# Patient Record
Sex: MALE | NOT HISPANIC OR LATINO | Employment: FULL TIME | URBAN - NONMETROPOLITAN AREA
[De-identification: names, ages, dates, MRNs, and addresses within clinical notes are randomized per-mention and may not be internally consistent; named-entity substitution may affect disease eponyms.]

---

## 2017-02-24 ENCOUNTER — OCCUPATIONAL MEDICINE (OUTPATIENT)
Dept: URGENT CARE | Facility: PHYSICIAN GROUP | Age: 37
End: 2017-02-24

## 2017-02-24 VITALS
TEMPERATURE: 98.4 F | HEART RATE: 89 BPM | BODY MASS INDEX: 25.77 KG/M2 | SYSTOLIC BLOOD PRESSURE: 122 MMHG | HEIGHT: 70 IN | DIASTOLIC BLOOD PRESSURE: 72 MMHG | WEIGHT: 180 LBS | OXYGEN SATURATION: 96 %

## 2017-02-24 DIAGNOSIS — Z02.89 EXAMINATION, PHYSICAL, EMPLOYEE: ICD-10-CM

## 2017-02-24 PROCEDURE — 99204 OFFICE O/P NEW MOD 45 MIN: CPT | Performed by: PHYSICIAN ASSISTANT

## 2017-02-24 PROCEDURE — 8906 PR URINE 11 PANEL: Performed by: PHYSICIAN ASSISTANT

## 2017-02-24 NOTE — PROGRESS NOTES
36 y.o. male comes in for a preemployment physical. No major medical history, no chronic conditions, no chronic medications. No history of asthma, heart disease, murmurs, seizure disorder or syncopal episodes with activity. He completed his physical therapy evaluation, A attached indicating he has no limitations or restrictions.  Visual acuity is reduced in the right eye, 20/50 without correction. Recommended optometry evaluation  Please see the chart for further information, however normal physical exam, cleared for employment without restrictions.

## 2017-02-24 NOTE — MR AVS SNAPSHOT
"Jose Daniel Shipley   2017 9:20 AM   Occupational Medicine   MRN: [id number]    Department:  [facility]   Dept Phone:  [phone]    Description:  Male : 1980   Provider:  Omero Reyes PA-C           Reason for Visit     Employment Physical           Allergies as of 2017     No Known Allergies      You were diagnosed with     Examination, physical, employee   [[id number]]         Vital Signs     Blood Pressure Pulse Temperature Height Weight Body Mass Index    122/72 mmHg 89 36.9 °C (98.4 °F) 1.778 m (5' 10\") 81.647 kg (180 lb) 25.83 kg/m2    Oxygen Saturation Smoking Status                96% Never Smoker           Basic Information     Date Of Birth Sex Race Ethnicity Preferred Language    1980 Male Unable to Obtain Non- English      Health Maintenance     Patient has no pending health maintenance at this time      Current Immunizations     No immunizations on file.      Below and/or attached are the medications your provider expects you to take. Review all of your home medications and newly ordered medications with your provider and/or pharmacist. Follow medication instructions as directed by your provider and/or pharmacist. Please keep your medication list with you and share with your provider. Update the information when medications are discontinued, doses are changed, or new medications (including over-the-counter products) are added; and carry medication information at all times in the event of emergency situations     Allergies:  No Known Allergies          Medications  Valid as of: 2017 - 10:33 AM    Generic Name Brand Name Tablet Size Instructions for use    .                 Medicines prescribed today were sent to:     None      Medication refill instructions:       If your prescription bottle indicates you have medication refills left, it is not necessary to call your provider’s office. Please contact your pharmacy and they will refill your " medication.    If your prescription bottle indicates you do not have any refills left, you may request refills at any time through one of the following ways: The online [employer] system (except Urgent Care), by calling your provider’s office, or by asking your pharmacy to contact your provider’s office with a refill request. Medication refills are processed only during regular business hours and may not be available until the next business day. Your provider may request additional information or to have a follow-up visit with you prior to refilling your medication.   *Please Note: Medication refills are assigned a new Rx number when refilled electronically. Your pharmacy may indicate that no refills were authorized even though a new prescription for the same medication is available at the pharmacy. Please request the medicine by name with the pharmacy before contacting your provider for a refill.           Re[employer] Access Code: [email or web address]  Expires: 3/26/2017 10:33 AM    Your email address is not on file at [facility].  Email Addresses are required for you to sign up for [employer], please contact [phone] to verify your personal information and to provide your email address prior to attempting to register for [employer].    Jose Daniel Shipley  [address] [place] [address]    Re[employer]  A secure, online tool to manage your health information     [facility]’s Re[employer]® is a secure, online tool that connects you to your personalized health information from the privacy of your home -- day or night - making it very easy for you to manage your healthcare. Once the activation process is completed, you can even access your medical information using the Re[employer] nelia, which is available for free in the Apple Nelia store or Google Play store.     To learn more about Re[employer], visit [email or web address]    There are two levels of access available (as shown below):   My Chart Features  [facility] Primary Care Doctor  [facility]  Specialists [facility]  Urgent  Care [facility] Primary Care Doctor   Email your healthcare team securely and privately 24/7 X X X    Manage appointments: schedule your next appointment; view details of past/upcoming appointments X      Request prescription refills. X      View recent personal medical records, including lab and immunizations X X X X   View health record, including health history, allergies, medications X X X X   Read reports about your outpatient visits, procedures, consult and ER notes X X X X   See your discharge summary, which is a recap of your hospital and/or ER visit that includes your diagnosis, lab results, and care plan X X  X     How to register for [employer]:  Once your e-mail address has been verified, follow the following steps to sign up for [employer].     1. Go to  [email or web address]  2. Click on the Sign Up Now box, which takes you to the New Member Sign Up page. You will need to provide the following information:  a. Enter your [employer] Access Code exactly as it appears at the top of this page. (You will not need to use this code after you’ve completed the sign-up process. If you do not sign up before the expiration date, you must request a new code.)   b. Enter your date of birth.   c. Enter your home email address.   d. Click Submit, and follow the next screen’s instructions.  3. Create a EdeniQ ID. This will be your [employer] login ID and cannot be changed, so think of one that is secure and easy to remember.  4. Create a [employer] password. You can change your password at any time.  5. Enter your Password Reset Question and Answer. This can be used at a later time if you forget your password.   6. Enter your e-mail address. This allows you to receive e-mail notifications when new information is available in [employer].  7. Click Sign Up. You can now view your health information.    For assistance activating your [employer] account, call [phone]

## 2017-08-18 ENCOUNTER — NON-PROVIDER VISIT (OUTPATIENT)
Dept: URGENT CARE | Facility: PHYSICIAN GROUP | Age: 37
End: 2017-08-18

## 2017-08-18 DIAGNOSIS — Z02.1 PRE-EMPLOYMENT DRUG SCREENING: ICD-10-CM

## 2017-08-18 PROCEDURE — 8907 PR URINE COLLECT ONLY: Performed by: PHYSICIAN ASSISTANT

## 2017-11-09 ENCOUNTER — NON-PROVIDER VISIT (OUTPATIENT)
Dept: URGENT CARE | Facility: PHYSICIAN GROUP | Age: 37
End: 2017-11-09

## 2017-11-09 DIAGNOSIS — Z02.1 PRE-EMPLOYMENT DRUG SCREENING: ICD-10-CM

## 2017-11-09 LAB
AMP AMPHETAMINE: NORMAL
COC COCAINE: NORMAL
INT CON NEG: NORMAL
INT CON POS: NORMAL
MET METHAMPHETAMINES: NORMAL
OPI OPIATES: NORMAL
PCP PHENCYCLIDINE: NORMAL
POC DRUG COMMENT 753798-OCCUPATIONAL HEALTH: NORMAL
THC: NORMAL

## 2017-11-09 PROCEDURE — 80305 DRUG TEST PRSMV DIR OPT OBS: CPT | Performed by: PHYSICIAN ASSISTANT

## 2018-05-11 ENCOUNTER — OCCUPATIONAL MEDICINE (OUTPATIENT)
Dept: URGENT CARE | Facility: PHYSICIAN GROUP | Age: 38
End: 2018-05-11

## 2018-05-11 VITALS
WEIGHT: 182 LBS | DIASTOLIC BLOOD PRESSURE: 72 MMHG | HEIGHT: 69 IN | BODY MASS INDEX: 26.96 KG/M2 | TEMPERATURE: 96.9 F | RESPIRATION RATE: 14 BRPM | OXYGEN SATURATION: 100 % | HEART RATE: 60 BPM | SYSTOLIC BLOOD PRESSURE: 124 MMHG

## 2018-05-11 DIAGNOSIS — Z02.89 PHYSICAL EXAMINATION OF EMPLOYEE: ICD-10-CM

## 2018-05-11 DIAGNOSIS — Z02.1 PHYSICAL EXAM, PRE-EMPLOYMENT: ICD-10-CM

## 2018-05-11 DIAGNOSIS — Z02.1 PRE-EMPLOYMENT DRUG SCREENING: ICD-10-CM

## 2018-05-11 LAB
AMP AMPHETAMINE: NORMAL
COC COCAINE: NORMAL
INT CON NEG: NORMAL
INT CON POS: NORMAL
MET METHAMPHETAMINES: NORMAL
OPI OPIATES: NORMAL
PCP PHENCYCLIDINE: NORMAL
POC DRUG COMMENT 753798-OCCUPATIONAL HEALTH: NEGATIVE
THC: NORMAL

## 2018-05-11 PROCEDURE — 92553 AUDIOMETRY AIR & BONE: CPT | Performed by: EMERGENCY MEDICINE

## 2018-05-11 PROCEDURE — 8919 PR LIMITED CLEARANCE: Performed by: EMERGENCY MEDICINE

## 2018-05-11 PROCEDURE — 94010 BREATHING CAPACITY TEST: CPT | Performed by: EMERGENCY MEDICINE

## 2018-05-11 PROCEDURE — 80305 DRUG TEST PRSMV DIR OPT OBS: CPT | Performed by: EMERGENCY MEDICINE

## 2018-05-11 ASSESSMENT — ENCOUNTER SYMPTOMS
HEMOPTYSIS: 0
EYE DISCHARGE: 0
SPEECH CHANGE: 0
FEVER: 0
NECK PAIN: 0
EYE REDNESS: 0
PALPITATIONS: 0
MYALGIAS: 0
NAUSEA: 0
COUGH: 0
SPUTUM PRODUCTION: 0
SENSORY CHANGE: 0
BACK PAIN: 0
ABDOMINAL PAIN: 0
CHILLS: 0
INSOMNIA: 0
VOMITING: 0
SINUS PAIN: 0

## 2018-05-12 NOTE — PROGRESS NOTES
"Subjective:      Jose Daniel Shipley is a 37 y.o. male who presents with Employment Physical            HPI  Patient is here for a physical examination as a . Patient denies any significant medical issues. No history of seizures, concussions cardiac dysrhythmias or major surgeries.    No Known Allergies   Social History     Social History   • Marital status: Unknown     Spouse name: N/A   • Number of children: N/A   • Years of education: N/A     Occupational History   • Not on file.     Social History Main Topics   • Smoking status: Never Smoker   • Smokeless tobacco: Not on file   • Alcohol use Not on file   • Drug use: Unknown   • Sexual activity: Not on file     Other Topics Concern   • Not on file     Social History Narrative   • No narrative on file   No past medical history on file.   No current outpatient prescriptions on file prior to visit.     No current facility-administered medications on file prior to visit.    No family history on file.  Review of Systems   Constitutional: Negative for chills and fever.   HENT: Negative for congestion and sinus pain.    Eyes: Negative for discharge and redness.   Respiratory: Negative for cough, hemoptysis and sputum production.    Cardiovascular: Negative for chest pain and palpitations.   Gastrointestinal: Negative for abdominal pain, nausea and vomiting.   Genitourinary: Negative.    Musculoskeletal: Negative for back pain, joint pain, myalgias and neck pain.   Skin: Negative for rash.   Neurological: Negative for sensory change and speech change.   Psychiatric/Behavioral: The patient does not have insomnia.           Objective:     /72   Pulse 60   Temp 36.1 °C (96.9 °F)   Resp 14   Ht 1.753 m (5' 9\")   Wt 82.6 kg (182 lb)   SpO2 100%   BMI 26.88 kg/m²      Physical Exam   Constitutional: He appears well-developed and well-nourished. No distress.   HENT:   Head: Normocephalic and atraumatic.   Right Ear: External ear normal.   Left Ear: " External ear normal.   Eyes: Conjunctivae and EOM are normal. Pupils are equal, round, and reactive to light. Right eye exhibits no discharge. Left eye exhibits no discharge.   Neck: Normal range of motion.   Cardiovascular: Normal rate.    No murmur heard.  Pulmonary/Chest: Effort normal. Stridor present. No respiratory distress. He has no wheezes.   Abdominal: He exhibits no distension. There is tenderness.   Musculoskeletal: Normal range of motion. He exhibits no edema or tenderness.   Neurological: He is alert. No cranial nerve deficit.   Skin: He is not diaphoretic. No erythema.   Psychiatric: He has a normal mood and affect. His behavior is normal.   Vitals reviewed.              Assessment/Plan:     1. Physical examination of employee / patient passed physical with no restrictions.      2. Pre-employment drug screening      Please refer to the history and physical scanned into the chart  - POCT 6 Panel Urine Drug Screen

## 2025-07-08 ENCOUNTER — HOSPITAL ENCOUNTER (INPATIENT)
Facility: MEDICAL CENTER | Age: 45
LOS: 2 days | DRG: 918 | End: 2025-07-10
Attending: EMERGENCY MEDICINE | Admitting: STUDENT IN AN ORGANIZED HEALTH CARE EDUCATION/TRAINING PROGRAM
Payer: MEDICAID

## 2025-07-08 DIAGNOSIS — T14.90XA TRAUMA: ICD-10-CM

## 2025-07-08 DIAGNOSIS — T63.011A RATTLESNAKE BITE, ACCIDENTAL OR UNINTENTIONAL, INITIAL ENCOUNTER: Primary | ICD-10-CM
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
## 2025-07-08 PROBLEM — F10.929 ACUTE ALCOHOL INTOXICATION (HCC): Status: ACTIVE | Noted: 2025-07-08

## 2025-07-08 PROBLEM — Z53.09 CONTRAINDICATION TO DEEP VEIN THROMBOSIS (DVT) PROPHYLAXIS: Status: ACTIVE | Noted: 2025-07-08

## 2025-07-08 PROBLEM — Z78.9 NO CONTRAINDICATION TO DEEP VEIN THROMBOSIS (DVT) PROPHYLAXIS: Status: ACTIVE | Noted: 2025-07-08

## 2025-07-08 LAB
ABO + RH BLD: NORMAL
ABO GROUP BLD: NORMAL
ALBUMIN SERPL BCP-MCNC: 4.4 G/DL (ref 3.2–4.9)
ALBUMIN/GLOB SERPL: 1.3 G/DL
ALP SERPL-CCNC: 88 U/L (ref 30–99)
ALT SERPL-CCNC: 14 U/L (ref 2–50)
ANION GAP SERPL CALC-SCNC: 16 MMOL/L (ref 7–16)
APTT PPP: 29.6 SEC (ref 24.7–36)
AST SERPL-CCNC: 33 U/L (ref 12–45)
BILIRUB SERPL-MCNC: 0.9 MG/DL (ref 0.1–1.5)
BLD GP AB SCN SERPL QL: NORMAL
BUN SERPL-MCNC: 12 MG/DL (ref 8–22)
CALCIUM ALBUM COR SERPL-MCNC: 8.7 MG/DL (ref 8.5–10.5)
CALCIUM SERPL-MCNC: 9 MG/DL (ref 8.5–10.5)
CHLORIDE SERPL-SCNC: 99 MMOL/L (ref 96–112)
CK SERPL-CCNC: 196 U/L (ref 0–154)
CO2 SERPL-SCNC: 21 MMOL/L (ref 20–33)
CREAT SERPL-MCNC: 0.96 MG/DL (ref 0.5–1.4)
ERYTHROCYTE [DISTWIDTH] IN BLOOD BY AUTOMATED COUNT: 42.4 FL (ref 35.9–50)
ERYTHROCYTE [DISTWIDTH] IN BLOOD BY AUTOMATED COUNT: 42.5 FL (ref 35.9–50)
ERYTHROCYTE [DISTWIDTH] IN BLOOD BY AUTOMATED COUNT: 43.6 FL (ref 35.9–50)
ERYTHROCYTE [DISTWIDTH] IN BLOOD BY AUTOMATED COUNT: 43.8 FL (ref 35.9–50)
ERYTHROCYTE [DISTWIDTH] IN BLOOD BY AUTOMATED COUNT: 43.8 FL (ref 35.9–50)
ERYTHROCYTE [DISTWIDTH] IN BLOOD BY AUTOMATED COUNT: 43.9 FL (ref 35.9–50)
ETHANOL BLD-MCNC: 205 MG/DL
FIBRINOGEN PPP-MCNC: 234 MG/DL (ref 215–460)
FIBRINOGEN PPP-MCNC: 267 MG/DL (ref 215–460)
FIBRINOGEN PPP-MCNC: 269 MG/DL (ref 215–460)
FIBRINOGEN PPP-MCNC: 275 MG/DL (ref 215–460)
FIBRINOGEN PPP-MCNC: 286 MG/DL (ref 215–460)
FIBRINOGEN PPP-MCNC: 288 MG/DL (ref 215–460)
GFR SERPLBLD CREATININE-BSD FMLA CKD-EPI: 99 ML/MIN/1.73 M 2
GLOBULIN SER CALC-MCNC: 3.4 G/DL (ref 1.9–3.5)
GLUCOSE SERPL-MCNC: 108 MG/DL (ref 65–99)
HCT VFR BLD AUTO: 38.2 % (ref 42–52)
HCT VFR BLD AUTO: 38.7 % (ref 42–52)
HCT VFR BLD AUTO: 39 % (ref 42–52)
HCT VFR BLD AUTO: 39.2 % (ref 42–52)
HCT VFR BLD AUTO: 41.7 % (ref 42–52)
HCT VFR BLD AUTO: 44.2 % (ref 42–52)
HGB BLD-MCNC: 13.1 G/DL (ref 14–18)
HGB BLD-MCNC: 13.3 G/DL (ref 14–18)
HGB BLD-MCNC: 13.3 G/DL (ref 14–18)
HGB BLD-MCNC: 13.4 G/DL (ref 14–18)
HGB BLD-MCNC: 14.2 G/DL (ref 14–18)
HGB BLD-MCNC: 14.9 G/DL (ref 14–18)
INR PPP: 1.01 (ref 0.87–1.13)
INR PPP: 1.03 (ref 0.87–1.13)
INR PPP: 1.07 (ref 0.87–1.13)
INR PPP: 1.09 (ref 0.87–1.13)
MCH RBC QN AUTO: 31.5 PG (ref 27–33)
MCH RBC QN AUTO: 31.8 PG (ref 27–33)
MCH RBC QN AUTO: 31.8 PG (ref 27–33)
MCH RBC QN AUTO: 32.1 PG (ref 27–33)
MCH RBC QN AUTO: 32.3 PG (ref 27–33)
MCH RBC QN AUTO: 32.3 PG (ref 27–33)
MCHC RBC AUTO-ENTMCNC: 33.7 G/DL (ref 32.3–36.5)
MCHC RBC AUTO-ENTMCNC: 33.9 G/DL (ref 32.3–36.5)
MCHC RBC AUTO-ENTMCNC: 34.1 G/DL (ref 32.3–36.5)
MCHC RBC AUTO-ENTMCNC: 34.3 G/DL (ref 32.3–36.5)
MCHC RBC AUTO-ENTMCNC: 34.4 G/DL (ref 32.3–36.5)
MCHC RBC AUTO-ENTMCNC: 34.4 G/DL (ref 32.3–36.5)
MCV RBC AUTO: 93.4 FL (ref 81.4–97.8)
MCV RBC AUTO: 93.5 FL (ref 81.4–97.8)
MCV RBC AUTO: 93.6 FL (ref 81.4–97.8)
MCV RBC AUTO: 93.8 FL (ref 81.4–97.8)
MCV RBC AUTO: 93.9 FL (ref 81.4–97.8)
MCV RBC AUTO: 94 FL (ref 81.4–97.8)
PLATELET # BLD AUTO: 177 K/UL (ref 164–446)
PLATELET # BLD AUTO: 180 K/UL (ref 164–446)
PLATELET # BLD AUTO: 180 K/UL (ref 164–446)
PLATELET # BLD AUTO: 182 K/UL (ref 164–446)
PLATELET # BLD AUTO: 199 K/UL (ref 164–446)
PLATELET # BLD AUTO: 216 K/UL (ref 164–446)
PMV BLD AUTO: 9.5 FL (ref 9–12.9)
PMV BLD AUTO: 9.5 FL (ref 9–12.9)
PMV BLD AUTO: 9.6 FL (ref 9–12.9)
PMV BLD AUTO: 9.6 FL (ref 9–12.9)
PMV BLD AUTO: 9.7 FL (ref 9–12.9)
PMV BLD AUTO: 9.9 FL (ref 9–12.9)
POTASSIUM SERPL-SCNC: 3.7 MMOL/L (ref 3.6–5.5)
PROT SERPL-MCNC: 7.8 G/DL (ref 6–8.2)
PROTHROMBIN TIME: 13.3 SEC (ref 12–14.6)
PROTHROMBIN TIME: 13.5 SEC (ref 12–14.6)
PROTHROMBIN TIME: 14 SEC (ref 12–14.6)
PROTHROMBIN TIME: 14.1 SEC (ref 12–14.6)
RBC # BLD AUTO: 4.08 M/UL (ref 4.7–6.1)
RBC # BLD AUTO: 4.12 M/UL (ref 4.7–6.1)
RBC # BLD AUTO: 4.15 M/UL (ref 4.7–6.1)
RBC # BLD AUTO: 4.18 M/UL (ref 4.7–6.1)
RBC # BLD AUTO: 4.46 M/UL (ref 4.7–6.1)
RBC # BLD AUTO: 4.73 M/UL (ref 4.7–6.1)
RH BLD: NORMAL
SODIUM SERPL-SCNC: 136 MMOL/L (ref 135–145)
WBC # BLD AUTO: 10.4 K/UL (ref 4.8–10.8)
WBC # BLD AUTO: 11 K/UL (ref 4.8–10.8)
WBC # BLD AUTO: 12.7 K/UL (ref 4.8–10.8)
WBC # BLD AUTO: 7.6 K/UL (ref 4.8–10.8)
WBC # BLD AUTO: 7.7 K/UL (ref 4.8–10.8)
WBC # BLD AUTO: 9.4 K/UL (ref 4.8–10.8)

## 2025-07-08 PROCEDURE — 700102 HCHG RX REV CODE 250 W/ 637 OVERRIDE(OP): Performed by: STUDENT IN AN ORGANIZED HEALTH CARE EDUCATION/TRAINING PROGRAM

## 2025-07-08 PROCEDURE — 85027 COMPLETE CBC AUTOMATED: CPT

## 2025-07-08 PROCEDURE — 86850 RBC ANTIBODY SCREEN: CPT

## 2025-07-08 PROCEDURE — A9270 NON-COVERED ITEM OR SERVICE: HCPCS | Mod: JZ | Performed by: SURGERY

## 2025-07-08 PROCEDURE — 700111 HCHG RX REV CODE 636 W/ 250 OVERRIDE (IP): Mod: JZ | Performed by: SURGERY

## 2025-07-08 PROCEDURE — A9270 NON-COVERED ITEM OR SERVICE: HCPCS | Performed by: STUDENT IN AN ORGANIZED HEALTH CARE EDUCATION/TRAINING PROGRAM

## 2025-07-08 PROCEDURE — A9270 NON-COVERED ITEM OR SERVICE: HCPCS | Performed by: NURSE PRACTITIONER

## 2025-07-08 PROCEDURE — 96375 TX/PRO/DX INJ NEW DRUG ADDON: CPT

## 2025-07-08 PROCEDURE — 96365 THER/PROPH/DIAG IV INF INIT: CPT

## 2025-07-08 PROCEDURE — 700111 HCHG RX REV CODE 636 W/ 250 OVERRIDE (IP): Mod: JZ | Performed by: NURSE PRACTITIONER

## 2025-07-08 PROCEDURE — 85610 PROTHROMBIN TIME: CPT

## 2025-07-08 PROCEDURE — 82550 ASSAY OF CK (CPK): CPT

## 2025-07-08 PROCEDURE — 770022 HCHG ROOM/CARE - ICU (200)

## 2025-07-08 PROCEDURE — 700102 HCHG RX REV CODE 250 W/ 637 OVERRIDE(OP): Mod: JZ | Performed by: SURGERY

## 2025-07-08 PROCEDURE — G0390 TRAUMA RESPONS W/HOSP CRITI: HCPCS

## 2025-07-08 PROCEDURE — 36415 COLL VENOUS BLD VENIPUNCTURE: CPT

## 2025-07-08 PROCEDURE — 700102 HCHG RX REV CODE 250 W/ 637 OVERRIDE(OP): Performed by: NURSE PRACTITIONER

## 2025-07-08 PROCEDURE — 99291 CRITICAL CARE FIRST HOUR: CPT

## 2025-07-08 PROCEDURE — 80053 COMPREHEN METABOLIC PANEL: CPT

## 2025-07-08 PROCEDURE — 86900 BLOOD TYPING SEROLOGIC ABO: CPT

## 2025-07-08 PROCEDURE — 85384 FIBRINOGEN ACTIVITY: CPT

## 2025-07-08 PROCEDURE — 86901 BLOOD TYPING SEROLOGIC RH(D): CPT | Mod: 91

## 2025-07-08 PROCEDURE — 700105 HCHG RX REV CODE 258: Performed by: EMERGENCY MEDICINE

## 2025-07-08 PROCEDURE — 700105 HCHG RX REV CODE 258: Performed by: NURSE PRACTITIONER

## 2025-07-08 PROCEDURE — 82077 ASSAY SPEC XCP UR&BREATH IA: CPT

## 2025-07-08 PROCEDURE — 700111 HCHG RX REV CODE 636 W/ 250 OVERRIDE (IP): Mod: JZ | Performed by: EMERGENCY MEDICINE

## 2025-07-08 PROCEDURE — 85730 THROMBOPLASTIN TIME PARTIAL: CPT

## 2025-07-08 PROCEDURE — 700105 HCHG RX REV CODE 258: Performed by: SURGERY

## 2025-07-08 RX ORDER — ENOXAPARIN SODIUM 100 MG/ML
40 INJECTION SUBCUTANEOUS EVERY 12 HOURS
Status: DISCONTINUED | OUTPATIENT
Start: 2025-07-09 | End: 2025-07-10 | Stop reason: HOSPADM

## 2025-07-08 RX ORDER — HYDROMORPHONE HYDROCHLORIDE 1 MG/ML
INJECTION, SOLUTION INTRAMUSCULAR; INTRAVENOUS; SUBCUTANEOUS
Status: COMPLETED | OUTPATIENT
Start: 2025-07-08 | End: 2025-07-08

## 2025-07-08 RX ORDER — ONDANSETRON 2 MG/ML
4 INJECTION INTRAMUSCULAR; INTRAVENOUS EVERY 4 HOURS PRN
Status: DISCONTINUED | OUTPATIENT
Start: 2025-07-08 | End: 2025-07-10 | Stop reason: HOSPADM

## 2025-07-08 RX ORDER — PROCHLORPERAZINE EDISYLATE 5 MG/ML
5-10 INJECTION INTRAMUSCULAR; INTRAVENOUS EVERY 4 HOURS PRN
Status: DISCONTINUED | OUTPATIENT
Start: 2025-07-08 | End: 2025-07-10 | Stop reason: HOSPADM

## 2025-07-08 RX ORDER — LABETALOL HYDROCHLORIDE 5 MG/ML
10 INJECTION, SOLUTION INTRAVENOUS EVERY 4 HOURS PRN
Status: DISCONTINUED | OUTPATIENT
Start: 2025-07-08 | End: 2025-07-10 | Stop reason: HOSPADM

## 2025-07-08 RX ORDER — AMOXICILLIN 250 MG
1 CAPSULE ORAL
Status: DISCONTINUED | OUTPATIENT
Start: 2025-07-08 | End: 2025-07-10 | Stop reason: HOSPADM

## 2025-07-08 RX ORDER — AMOXICILLIN 250 MG
1 CAPSULE ORAL NIGHTLY
Status: DISCONTINUED | OUTPATIENT
Start: 2025-07-08 | End: 2025-07-10 | Stop reason: HOSPADM

## 2025-07-08 RX ORDER — METHYLPREDNISOLONE SODIUM SUCCINATE 125 MG/2ML
125 INJECTION, POWDER, LYOPHILIZED, FOR SOLUTION INTRAMUSCULAR; INTRAVENOUS
Status: DISCONTINUED | OUTPATIENT
Start: 2025-07-08 | End: 2025-07-10 | Stop reason: HOSPADM

## 2025-07-08 RX ORDER — LORAZEPAM 1 MG/1
1 TABLET ORAL EVERY 4 HOURS PRN
Status: DISCONTINUED | OUTPATIENT
Start: 2025-07-08 | End: 2025-07-09

## 2025-07-08 RX ORDER — OXYCODONE HYDROCHLORIDE 5 MG/1
5 TABLET ORAL
Refills: 0 | Status: DISCONTINUED | OUTPATIENT
Start: 2025-07-08 | End: 2025-07-10 | Stop reason: HOSPADM

## 2025-07-08 RX ORDER — HYDROMORPHONE HYDROCHLORIDE 1 MG/ML
0.25 INJECTION, SOLUTION INTRAMUSCULAR; INTRAVENOUS; SUBCUTANEOUS
Status: DISCONTINUED | OUTPATIENT
Start: 2025-07-08 | End: 2025-07-10 | Stop reason: HOSPADM

## 2025-07-08 RX ORDER — ACETAMINOPHEN 500 MG
1000 TABLET ORAL EVERY 6 HOURS
Status: DISCONTINUED | OUTPATIENT
Start: 2025-07-08 | End: 2025-07-10 | Stop reason: HOSPADM

## 2025-07-08 RX ORDER — ACETAMINOPHEN 500 MG
1000 TABLET ORAL EVERY 6 HOURS PRN
Status: DISCONTINUED | OUTPATIENT
Start: 2025-07-13 | End: 2025-07-10 | Stop reason: HOSPADM

## 2025-07-08 RX ORDER — DIAZEPAM 10 MG/2ML
10 INJECTION, SOLUTION INTRAMUSCULAR; INTRAVENOUS
Status: DISCONTINUED | OUTPATIENT
Start: 2025-07-08 | End: 2025-07-09

## 2025-07-08 RX ORDER — POLYETHYLENE GLYCOL 3350 17 G/17G
1 POWDER, FOR SOLUTION ORAL 2 TIMES DAILY
Status: DISCONTINUED | OUTPATIENT
Start: 2025-07-08 | End: 2025-07-10 | Stop reason: HOSPADM

## 2025-07-08 RX ORDER — CHLORDIAZEPOXIDE HYDROCHLORIDE 25 MG/1
50 CAPSULE, GELATIN COATED ORAL EVERY 6 HOURS
Status: DISPENSED | OUTPATIENT
Start: 2025-07-08 | End: 2025-07-09

## 2025-07-08 RX ORDER — LORAZEPAM 1 MG/1
0.5 TABLET ORAL EVERY 4 HOURS PRN
Status: DISCONTINUED | OUTPATIENT
Start: 2025-07-08 | End: 2025-07-09

## 2025-07-08 RX ORDER — CHLORDIAZEPOXIDE HYDROCHLORIDE 25 MG/1
25 CAPSULE, GELATIN COATED ORAL EVERY 6 HOURS
Status: DISCONTINUED | OUTPATIENT
Start: 2025-07-09 | End: 2025-07-10 | Stop reason: HOSPADM

## 2025-07-08 RX ORDER — OXYCODONE HYDROCHLORIDE 5 MG/1
5 TABLET ORAL
Refills: 0 | Status: DISCONTINUED | OUTPATIENT
Start: 2025-07-08 | End: 2025-07-08

## 2025-07-08 RX ORDER — SODIUM CHLORIDE 9 MG/ML
INJECTION, SOLUTION INTRAVENOUS CONTINUOUS
Status: DISCONTINUED | OUTPATIENT
Start: 2025-07-08 | End: 2025-07-09

## 2025-07-08 RX ORDER — HYDROMORPHONE HYDROCHLORIDE 1 MG/ML
0.25 INJECTION, SOLUTION INTRAMUSCULAR; INTRAVENOUS; SUBCUTANEOUS
Status: DISCONTINUED | OUTPATIENT
Start: 2025-07-08 | End: 2025-07-08

## 2025-07-08 RX ORDER — ONDANSETRON 4 MG/1
4 TABLET, ORALLY DISINTEGRATING ORAL EVERY 4 HOURS PRN
Status: DISCONTINUED | OUTPATIENT
Start: 2025-07-08 | End: 2025-07-10 | Stop reason: HOSPADM

## 2025-07-08 RX ORDER — OXYCODONE HYDROCHLORIDE 10 MG/1
10 TABLET ORAL
Refills: 0 | Status: DISCONTINUED | OUTPATIENT
Start: 2025-07-08 | End: 2025-07-10 | Stop reason: HOSPADM

## 2025-07-08 RX ORDER — DIPHENHYDRAMINE HYDROCHLORIDE 50 MG/ML
50 INJECTION, SOLUTION INTRAMUSCULAR; INTRAVENOUS
Status: DISCONTINUED | OUTPATIENT
Start: 2025-07-08 | End: 2025-07-10 | Stop reason: HOSPADM

## 2025-07-08 RX ORDER — GABAPENTIN 300 MG/1
300 CAPSULE ORAL EVERY 8 HOURS
Status: DISCONTINUED | OUTPATIENT
Start: 2025-07-08 | End: 2025-07-10 | Stop reason: HOSPADM

## 2025-07-08 RX ORDER — LORAZEPAM 2 MG/1
4 TABLET ORAL
Status: DISCONTINUED | OUTPATIENT
Start: 2025-07-08 | End: 2025-07-09

## 2025-07-08 RX ORDER — OXYCODONE HYDROCHLORIDE 5 MG/1
2.5 TABLET ORAL
Refills: 0 | Status: DISCONTINUED | OUTPATIENT
Start: 2025-07-08 | End: 2025-07-08

## 2025-07-08 RX ORDER — DOCUSATE SODIUM 100 MG/1
100 CAPSULE, LIQUID FILLED ORAL 2 TIMES DAILY
Status: DISCONTINUED | OUTPATIENT
Start: 2025-07-08 | End: 2025-07-10 | Stop reason: HOSPADM

## 2025-07-08 RX ORDER — POTASSIUM CHLORIDE 1500 MG/1
40 TABLET, EXTENDED RELEASE ORAL ONCE
Status: COMPLETED | OUTPATIENT
Start: 2025-07-08 | End: 2025-07-08

## 2025-07-08 RX ORDER — LORAZEPAM 2 MG/1
2 TABLET ORAL
Status: DISCONTINUED | OUTPATIENT
Start: 2025-07-08 | End: 2025-07-09

## 2025-07-08 RX ADMIN — CROTALIDAE IMMUNE F(AB)2(EQUINE) 10 VIAL: 12 INJECTION, POWDER, LYOPHILIZED, FOR SOLUTION INTRAVENOUS at 09:07

## 2025-07-08 RX ADMIN — SODIUM CHLORIDE: 9 INJECTION, SOLUTION INTRAVENOUS at 11:44

## 2025-07-08 RX ADMIN — GABAPENTIN 300 MG: 300 CAPSULE ORAL at 01:57

## 2025-07-08 RX ADMIN — OXYCODONE 5 MG: 5 TABLET ORAL at 09:22

## 2025-07-08 RX ADMIN — HYDROMORPHONE HYDROCHLORIDE 1 MG: 1 INJECTION, SOLUTION INTRAMUSCULAR; INTRAVENOUS; SUBCUTANEOUS at 00:18

## 2025-07-08 RX ADMIN — GABAPENTIN 300 MG: 300 CAPSULE ORAL at 21:44

## 2025-07-08 RX ADMIN — CHLORDIAZEPOXIDE HYDROCHLORIDE 50 MG: 25 CAPSULE ORAL at 16:20

## 2025-07-08 RX ADMIN — OXYCODONE 2.5 MG: 5 TABLET ORAL at 05:19

## 2025-07-08 RX ADMIN — SODIUM CHLORIDE: 9 INJECTION, SOLUTION INTRAVENOUS at 22:02

## 2025-07-08 RX ADMIN — ACETAMINOPHEN 1000 MG: 500 TABLET ORAL at 01:57

## 2025-07-08 RX ADMIN — GABAPENTIN 300 MG: 300 CAPSULE ORAL at 16:19

## 2025-07-08 RX ADMIN — POTASSIUM CHLORIDE 40 MEQ: 1500 TABLET, EXTENDED RELEASE ORAL at 09:22

## 2025-07-08 RX ADMIN — ACETAMINOPHEN 1000 MG: 500 TABLET ORAL at 16:19

## 2025-07-08 RX ADMIN — CROTALIDAE IMMUNE F(AB)2(EQUINE) 10 VIAL: 12 INJECTION, POWDER, LYOPHILIZED, FOR SOLUTION INTRAVENOUS at 04:06

## 2025-07-08 RX ADMIN — CROTALIDAE IMMUNE F(AB)2(EQUINE) 10 VIAL: 12 INJECTION, POWDER, LYOPHILIZED, FOR SOLUTION INTRAVENOUS at 17:04

## 2025-07-08 RX ADMIN — OXYCODONE 5 MG: 5 TABLET ORAL at 19:37

## 2025-07-08 RX ADMIN — CROTALIDAE IMMUNE F(AB)2(EQUINE) 10 VIAL: 12 INJECTION, POWDER, LYOPHILIZED, FOR SOLUTION INTRAVENOUS at 00:38

## 2025-07-08 RX ADMIN — SODIUM CHLORIDE: 9 INJECTION, SOLUTION INTRAVENOUS at 00:38

## 2025-07-08 SDOH — ECONOMIC STABILITY: TRANSPORTATION INSECURITY
IN THE PAST 12 MONTHS, HAS LACK OF RELIABLE TRANSPORTATION KEPT YOU FROM MEDICAL APPOINTMENTS, MEETINGS, WORK OR FROM GETTING THINGS NEEDED FOR DAILY LIVING?: YES

## 2025-07-08 SDOH — ECONOMIC STABILITY: TRANSPORTATION INSECURITY
IN THE PAST 12 MONTHS, HAS THE LACK OF TRANSPORTATION KEPT YOU FROM MEDICAL APPOINTMENTS OR FROM GETTING MEDICATIONS?: YES

## 2025-07-08 ASSESSMENT — LIFESTYLE VARIABLES
NAUSEA AND VOMITING: NO NAUSEA AND NO VOMITING
ALCOHOL_USE: YES
VISUAL DISTURBANCES: NOT PRESENT
EVER HAD A DRINK FIRST THING IN THE MORNING TO STEADY YOUR NERVES TO GET RID OF A HANGOVER: YES
ON A TYPICAL DAY WHEN YOU DRINK ALCOHOL HOW MANY DRINKS DO YOU HAVE: 10
TOTAL SCORE: 1
AUDITORY DISTURBANCES: NOT PRESENT
SUBSTANCE_ABUSE: 1
TOTAL SCORE: VERY MILD ITCHING, PINS AND NEEDLES SENSATION, BURNING OR NUMBNESS
AGITATION: NORMAL ACTIVITY
CONSUMPTION TOTAL: POSITIVE
TOTAL SCORE: 4
HAVE YOU EVER FELT YOU SHOULD CUT DOWN ON YOUR DRINKING: YES
DOES PATIENT WANT TO STOP DRINKING: YES
TOTAL SCORE: 4
PAROXYSMAL SWEATS: NO SWEAT VISIBLE
TOTAL SCORE: 4
HEADACHE, FULLNESS IN HEAD: NOT PRESENT
HAVE PEOPLE ANNOYED YOU BY CRITICIZING YOUR DRINKING: YES
ANXIETY: NO ANXIETY (AT EASE)
DOES PATIENT WANT TO TALK TO SOMEONE ABOUT QUITTING: YES
ORIENTATION AND CLOUDING OF SENSORIUM: ORIENTED AND CAN DO SERIAL ADDITIONS
AVERAGE NUMBER OF DAYS PER WEEK YOU HAVE A DRINK CONTAINING ALCOHOL: 1
TREMOR: NO TREMOR
HOW MANY TIMES IN THE PAST YEAR HAVE YOU HAD 5 OR MORE DRINKS IN A DAY: 52
EVER FELT BAD OR GUILTY ABOUT YOUR DRINKING: YES

## 2025-07-08 ASSESSMENT — ENCOUNTER SYMPTOMS
RESPIRATORY NEGATIVE: 1
SENSORY CHANGE: 1
MYALGIAS: 1

## 2025-07-08 ASSESSMENT — PATIENT HEALTH QUESTIONNAIRE - PHQ9
SUM OF ALL RESPONSES TO PHQ9 QUESTIONS 1 AND 2: 0
2. FEELING DOWN, DEPRESSED, IRRITABLE, OR HOPELESS: NOT AT ALL
1. LITTLE INTEREST OR PLEASURE IN DOING THINGS: NOT AT ALL

## 2025-07-08 ASSESSMENT — COPD QUESTIONNAIRES
DURING THE PAST 4 WEEKS HOW MUCH DID YOU FEEL SHORT OF BREATH: NONE/LITTLE OF THE TIME
COPD SCREENING SCORE: 1
DO YOU EVER COUGH UP ANY MUCUS OR PHLEGM?: YES, A FEW DAYS A WEEK OR MONTH
HAVE YOU SMOKED AT LEAST 100 CIGARETTES IN YOUR ENTIRE LIFE: NO/DON'T KNOW

## 2025-07-08 ASSESSMENT — COGNITIVE AND FUNCTIONAL STATUS - GENERAL
EATING MEALS: A LITTLE
SUGGESTED CMS G CODE MODIFIER MOBILITY: CH
DAILY ACTIVITIY SCORE: 23
SUGGESTED CMS G CODE MODIFIER DAILY ACTIVITY: CI
MOBILITY SCORE: 24

## 2025-07-08 ASSESSMENT — PAIN DESCRIPTION - PAIN TYPE
TYPE: ACUTE PAIN

## 2025-07-08 ASSESSMENT — FIBROSIS 4 INDEX: FIB4 SCORE: 2.04

## 2025-07-08 ASSESSMENT — SOCIAL DETERMINANTS OF HEALTH (SDOH)

## 2025-07-08 NOTE — PROGRESS NOTES
Poison control called RN.  Updates given.      Per poison control representative:    -Initial control of symptoms approx 1030.  Recommend monitoring for 18 hours from that time.   -Recommend pain control  -Recommend re dosing Anavip as needed for worsening symptoms.

## 2025-07-08 NOTE — PROGRESS NOTES
Updates provided to Nataliia NERI, regarding patient having increase in right arm edema past previously marked area. Antivenin has been completed for 1 hour, sent follow up coags. Escalated to pharmacy who is recommending second dose of antivenin based on increased edema.     Orders received from [facility] to administer Antivenin second dose. Charge RN called pharmacy to verify dosing.

## 2025-07-08 NOTE — PROGRESS NOTES
0730:  R. UE elevated, ice applied (plan 20 min on/20 min off).  Discussed patient status with Debbi pharmacist.  Will wait for coag results.     0810:  R. UE ROM completed.  Patient reports increased pain in his R. FA and elbow with ROM (states it feels like his forearm has been hit by a hammer).  Patient also reports an internal feeling of heat in his R. hand.  Swelling has increased into his R. Elbow.  (Has not progressed up the bicep).

## 2025-07-08 NOTE — PROGRESS NOTES
Updates provided to Nataliia NERI, regarding patient having increase in right arm edema past previously marked area. Antivenin has been completed for 1 hour, sent follow up coags. Escalated to pharmacy, poison control updated as well.

## 2025-07-08 NOTE — PROGRESS NOTES
1 hour post Antivip infusion, swelling has not progressed past leading edge.   Patient states the pain at the bite site (middle finger on R. Hand) has increased.  ROM is being performed by patient (self motivated).  R. UE elevated and iced (ice on x20min, off x20 min)

## 2025-07-08 NOTE — PROGRESS NOTES
Poison control case #: [id number]    Pharmacy Note: Poison control updated for rattlesnake bite to right middle finger.      Patient presented to [facility] with a rattlesnake bite to his R middle finger. [facility] used all their antivenom doses prior to patient arrival; patient transferred to [facility] to obtain care.Time of bite ~21:20 PM. Patient arrives with swelling noted to right middle finger, hand and redness up to mid/upper forearm.  Initial leading edge of swelling was marked. Progression of swelling noted to mid arm.    Labs ordered: initial PT/INR, fibrinogen, CMP, CK    Recommended Plan:  Administer Anavip 10 vials STAT. Infuse at 25-50 mL/hr for the first 10 minutes, observe patient for signs/symptoms of anaphylaxis. If no reaction, increase infusion rate to 250 mL/hr for the remainder of infusion. Assess patient for initial control: arrest of progression of venom-induced effects (no further advancement of local effects, improvement of systemic effects, and trending of abnormal coagulation parameters toward normal. Primary infusion only, can not be infused as secondary infusion. Please move affected extremity (R hand/arm) as soon as Anavip is done infusing. Encourage range of motion as much as possible.   Obtain repeat labs (PT/INR, PLT, Fibrinogen) 1h post initial infusion of Anavip dose, and obtain Q4h labs (PT/INR, PLT, Fibrinogen) thereafter.  If initial symptom control is not achieved, give another dose of Anavip 10 vials   Once sxs control is achieved, observe pt for 18 hours. If symptoms re-emerge during 18-hour time frame, redose w/ Anavip 4 vials; another 18 hour observation time frame will be re-initiated after completion of 4 vials.     Beverly Kuhn, PharmD, BCPS, BCEMP

## 2025-07-08 NOTE — CARE PLAN
Progress made toward(s) clinical / shift goals:        Problem: Knowledge Deficit - Standard  Goal: Patient and family/care givers will demonstrate understanding of plan of care, disease process/condition, diagnostic tests and medications  7/8/2025 0235 by Courtney Wong R.N.  Outcome: Progressing  7/8/2025 0235 by Courtney Wong R.N.  Outcome: Progressing

## 2025-07-08 NOTE — PROGRESS NOTES
Patient arrives to T928 with ACLS RNzx    /95  O2 93% RA   RR 20  72.9Kg  97.9F      4 Eyes Skin Assessment Completed by SHYANN Valdez and SHYANN Paul.    Skin assessment is primarily focused on high risk bony prominences. Pay special attention to skin beneath and around medical devices, high risk bony prominences, skin to skin areas and areas where the patient lacks sensation to feel pain and areas where the patient previously had breakdown.     Head (Occipital):  WDL   Ears (Under Medical Devices): WDL   Nose (Under Medical Devices): WDL   Mouth:  Missing teeth    Neck: WDL   Breast/Chest:  WDL   Shoulder Blades:  WDL   Spine:   WDL   (R) Arm/Elbow/Hand: Right forearm and right hand swelling, tenderness to hand    (L) Arm/Elbow/Hand: WDL   Abdomen: WDL   Pannus/Groin:  WDL   Sacrum/Coccyx:   WDL   (R) Ischial Tuberosity (Sit Bones):  WDL   (L) Ischial Tuberosity (Sit Bones):  **Wound/discoloration of bony prominence (Suspected Pressure injury)**   (R) Leg:  WDL   (L) Leg:  WDL   (R) Heel:  WDL   (R) Foot/Toe: Abrasions   (L) Heel: WDL   (L) Foot/Toe:  Abrasions       DEVICES IN USE:   Respiratory Devices:  NA, patient on room air  Feeding Devices:  N/A   Lines & BP Monitoring Devices:  Peripheral IV, BP cuff, and Pulse ox    Orthopedic Devices:  N/A  Miscellaneous Devices:  N/A    PROTOCOL INTERVENTIONS:   ICU Low Airlo Bed:  Applied this assessment  Offloading Dressing - Sacrum:  Applied this assessment  Q2 Turns with Pillows:  Applied this assessment  Glide Sheet:  Applied this assessment    WOUND PHOTOS:   N/A no wounds identified    WOUND CONSULT:   N/A, no advanced wound care needs identified

## 2025-07-08 NOTE — ASSESSMENT & PLAN NOTE
Rattlesnake bite to right middle finger.  Trauma Green Transfer Activation from [facility] in [place], [place].  Jonah Shaikh MD. Trauma Surgery.

## 2025-07-08 NOTE — ASSESSMENT & PLAN NOTE
Admission blood alcohol level of 205.  Trauma alcohol withdrawal protocol initiated.  Alcohol withdrawal surveillance.  SBIRT complete.  Librium taper ongoing

## 2025-07-08 NOTE — DISCHARGE PLANNING
NOK/Emergency Contact is Pts motherTiffany     Pt lives with a roommate in a single level home is [place]. He is unemployed and has no income. 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 He endorses drinking up to one pint of whiskey per day. He has tried quitting previously and was hospitalized for seizures. Pt was educated on the need for medical detox and given resources for both inpatient and outpatient treatment centers, as well as meetings and outpatient therapy.    PFA was given information-Pt says he has medicaid.    Care Transition Team Assessment    Information Source  Orientation Level: Oriented X4  Information Given By: Patient  Who is responsible for making decisions for patient? : Patient    Readmission Evaluation  Is this a readmission?: No    Elopement Risk  Legal Hold: No  Ambulatory or Self Mobile in Wheelchair: No-Not an Elopement Risk  Disoriented: No  Elopement Risk: Not at Risk for Elopement    Interdisciplinary Discharge Planning  Lives with - Patient's Self Care Capacity: Unrelated Adult  Patient or legal guardian wants to designate a caregiver: No  Support Systems: Friends / Neighbors  Housing / Facility: [address]    Discharge Preparedness  What is your plan after discharge?: Home with help  What are your discharge supports?: Parent, Partner  Prior Functional Level: Ambulatory, Drives Self, Independent with Activities of Daily Living, Independent with Medication Management  Difficulity with IADLs: None    Functional Assesment  Prior Functional Level: Ambulatory, Drives Self, Independent with Activities of Daily Living, Independent with Medication Management    Finances  Financial Barriers to Discharge: No  Prescription Coverage: Yes    Vision / Hearing Impairment  Vision Impairment : No  Hearing Impairment : No         Advance Directive  Advance Directive?: None  Advance Directive offered?: AD Booklet refused    Domestic Abuse  Have you ever been the  victim of abuse or violence?: No  Possible Abuse/Neglect Reported to:: Not Applicable    Psychological Assessment  History of Substance Abuse: Alcohol  Date Last Used - Alcohol: 7/8  Substance Abuse Comments: Attempted self detox 'a few years ago' caused seizures, was hospitalized  History of Psychiatric Problems: No  Non-compliant with Treatment: No  Newly Diagnosed Illness: Yes    Discharge Risks or Barriers  Discharge risks or barriers?: No PCP, Uninsured / underinsured, Substance abuse  Patient risk factors: Cognitive / sensory / physical deficit, No PCP, Substance abuse    Anticipated Discharge Information  Discharge Disposition: Discharged to home/self care (01)  Discharge Address: Home ([facility] [address] [place] [phone])  Discharge Contact Phone Number: Mom ()

## 2025-07-08 NOTE — ED TRIAGE NOTES
"Chief Complaint   Patient presents with    Trauma Green     Transfer from [facility] after sustaining a rattlesnake bite to right middle finger at 2130 tonight     Patient BIB [facility] from [facility] as a trauma green transfer after a rattlesnake bite to his right middle finger at 2130 tonight.     Patient arrives with swelling noted to right middle finger and redness up to mid/upper forearm.    BP (!) 133/98   Pulse (!) 119   Temp 36.3 °C (97.4 °F)   Resp 19   Ht 1.778 m (5' 10\")   Wt 74.8 kg (165 lb)   SpO2 94%  - RA  "

## 2025-07-08 NOTE — ED PROVIDER NOTES
"ER Provider Note    Scribed for Dr. Johanne David D.O. by Karen Correa. 7/8/2025  12:10 AM    Primary Care Provider: Pcp Pt States None    CHIEF COMPLAINT  Chief Complaint   Patient presents with    Trauma Green     Transfer from [facility] after sustaining a rattlesnake bite to right middle finger at 2130 tonight       EXTERNAL RECORDS REVIEWED  Other No pertinent records available for review.     HPI/ROS  LIMITATION TO HISTORY   Select: : None    OUTSIDE HISTORIAN(S):  EMS  at bedside to confirm sequence of events and collateral information provided. See HPI below.    Jose Daniel Shipley is a 45 y.o. male who presents to the ED via EMS as a Trauma Green transfer from [facility] for evaluation of a rattle snake bite to the right middle finger onset tonight at 9:30 PM. Patient reports he was bit on his friends property in [place]. The patient was transferred here, as [facility] was out of the antivenom. He also states he has some paresthesia in his fingertips of his first and second digit on the right hand, but denies any chest pain or shortness of breath. EMS reports the swelling in his right hand was marked and has slightly spread past the marking down the right forearm. They also report that he was given a tetanus vaccination and Dilaudid at the transferring facility.     PAST MEDICAL HISTORY  Past Medical History[1]    SURGICAL HISTORY  Past Surgical History[2]    FAMILY HISTORY  No pertinent family history.     SOCIAL HISTORY   reports that he has never smoked. He has never used smokeless tobacco.    CURRENT MEDICATIONS  No current outpatient medications     ALLERGIES  Patient has no known allergies.      PHYSICAL EXAM  BP (!) 133/98   Pulse (!) 119   Temp 36.3 °C (97.4 °F)   Resp 19   Ht 1.778 m (5' 10\")   Wt 74.8 kg (165 lb)   SpO2 94%   BMI 23.68 kg/m²     Constitutional: Patient is well developed, well nourished. Non-toxic appearing.  Intoxicated in mild distress  HENT: " Normocephalic, atraumatic.  Moist oral mucosa.  Cardiovascular: Normal heart rate and Regular rhythm. No murmur,  Thorax & Lungs: Clear and equal breath sounds with good excursion. No respiratory distress, no rhonchi, wheezing  Abdomen: Bowel sounds normal in all four quadrants. Soft, nontender, no rebound, guarding, palpable masses.   Skin: Warm, Dry  Extremities: Peripheral pulses 4/4 No tenderness. Snake bite on the dorsal aspect of proximal phalanx of the right middle finger.  Moderate soft tissue swelling from right middle finger to mid forearm.  No erythema or ecchymotic changes  Neurologic: Alert & oriented x 3, Normal motor function, Normal sensory function,  Psychiatric: Affect normal, Judgment normal, Mood normal.       DIAGNOSTIC STUDIES & PROCEDURES    Labs:   Results for orders placed or performed during the hospital encounter of 07/08/25   PT/INR EVERY 4 HOURS    Collection Time: 07/08/25 12:12 AM   Result Value Ref Range    PT 13.3 12.0 - 14.6 sec    INR 1.01 0.87 - 1.13   FIBRINOGEN    Collection Time: 07/08/25 12:12 AM   Result Value Ref Range    Fibrinogen 286 215 - 460 mg/dL   APTT    Collection Time: 07/08/25 12:12 AM   Result Value Ref Range    APTT 29.6 24.7 - 36.0 sec   COD - Adult (Type and Screen)    Collection Time: 07/08/25 12:12 AM   Result Value Ref Range    ABO Grouping Only O     Rh Grouping Only POS     Antibody Screen-Cod NEG    ABO Rh Confirm    Collection Time: 07/08/25 12:44 AM   Result Value Ref Range    ABO Rh Confirm O POS    Comp Metabolic Panel    Collection Time: 07/08/25 12:44 AM   Result Value Ref Range    Sodium 136 135 - 145 mmol/L    Potassium 3.7 3.6 - 5.5 mmol/L    Chloride 99 96 - 112 mmol/L    Co2 21 20 - 33 mmol/L    Anion Gap 16.0 7.0 - 16.0    Glucose 108 (H) 65 - 99 mg/dL    Bun 12 8 - 22 mg/dL    Creatinine 0.96 0.50 - 1.40 mg/dL    Calcium 9.0 8.5 - 10.5 mg/dL    Correct Calcium 8.7 8.5 - 10.5 mg/dL    AST(SGOT) 33 12 - 45 U/L    ALT(SGPT) 14 2 - 50 U/L     Alkaline Phosphatase 88 30 - 99 U/L    Total Bilirubin 0.9 0.1 - 1.5 mg/dL    Albumin 4.4 3.2 - 4.9 g/dL    Total Protein 7.8 6.0 - 8.2 g/dL    Globulin 3.4 1.9 - 3.5 g/dL    A-G Ratio 1.3 g/dL   CREATINE KINASE    Collection Time: 07/08/25 12:44 AM   Result Value Ref Range    CPK Total 196 (H) 0 - 154 U/L   DIAGNOSTIC ALCOHOL    Collection Time: 07/08/25 12:44 AM   Result Value Ref Range    Diagnostic Alcohol 205.0 (H) <10.1 mg/dL   ESTIMATED GFR    Collection Time: 07/08/25 12:44 AM   Result Value Ref Range    GFR (CKD-EPI) 99 >60 mL/min/1.73 m 2   CBC WITHOUT DIFFERENTIAL    Collection Time: 07/08/25  1:04 AM   Result Value Ref Range    WBC 10.4 4.8 - 10.8 K/uL    RBC 4.73 4.70 - 6.10 M/uL    Hemoglobin 14.9 14.0 - 18.0 g/dL    Hematocrit 44.2 42.0 - 52.0 %    MCV 93.4 81.4 - 97.8 fL    MCH 31.5 27.0 - 33.0 pg    MCHC 33.7 32.3 - 36.5 g/dL    RDW 42.4 35.9 - 50.0 fL    Platelet Count 182 164 - 446 K/uL    MPV 9.5 9.0 - 12.9 fL   CBC WITHOUT DIFFERENTIAL EVERY 4 HOURS    Collection Time: 07/08/25  2:45 AM   Result Value Ref Range    WBC 12.7 (H) 4.8 - 10.8 K/uL    RBC 4.46 (L) 4.70 - 6.10 M/uL    Hemoglobin 14.2 14.0 - 18.0 g/dL    Hematocrit 41.7 (L) 42.0 - 52.0 %    MCV 93.5 81.4 - 97.8 fL    MCH 31.8 27.0 - 33.0 pg    MCHC 34.1 32.3 - 36.5 g/dL    RDW 42.5 35.9 - 50.0 fL    Platelet Count 216 164 - 446 K/uL    MPV 9.6 9.0 - 12.9 fL   PT/INR EVERY 4 HOURS    Collection Time: 07/08/25  2:45 AM   Result Value Ref Range    PT 14.0 12.0 - 14.6 sec    INR 1.07 0.87 - 1.13   FIBRINOGEN    Collection Time: 07/08/25  2:45 AM   Result Value Ref Range    Fibrinogen 275 215 - 460 mg/dL   CBC WITHOUT DIFFERENTIAL EVERY 4 HOURS    Collection Time: 07/08/25  6:30 AM   Result Value Ref Range    WBC 11.0 (H) 4.8 - 10.8 K/uL    RBC 4.18 (L) 4.70 - 6.10 M/uL    Hemoglobin 13.3 (L) 14.0 - 18.0 g/dL    Hematocrit 39.2 (L) 42.0 - 52.0 %    MCV 93.8 81.4 - 97.8 fL    MCH 31.8 27.0 - 33.0 pg    MCHC 33.9 32.3 - 36.5 g/dL    RDW 43.8  35.9 - 50.0 fL    Platelet Count 199 164 - 446 K/uL    MPV 9.5 9.0 - 12.9 fL     All labs reviewed by me.      COURSE & MEDICAL DECISION MAKING     INITIAL ASSESSMENT AND PLAN  Care Narrative:       12:04 AM - Patient seen and evaluated at bedside. This is a 45 year old man who presents to the emergency department via EMS as a Trauma Green transfer from [facility] for evaluation of a rattle snake bite. Discussed plan of care, including labs. Patient agrees to plan of care. Patient will be treated with NS fluids, Dilaudid injection, Benadryl 50 mg injection, Famotidine 40 mg injection, Anavip 10 vial in  mL, and SoluMedrol 125 mg injection for his symptoms. Ordered CMP and CBC  w/o diff to evaluate.     12:09 AM - I discussed the patient's case and the above findings with the pharmacist and requested antivenom to be ordered.    Poison control was also notified and case number was given to pharmacist.    12:14 AM - I paged Dr. Shaikh (Trauma Surgery).     12:20 AM - I discussed the patient's case and the above findings with Dr. Shaikh (Trauma Surgery) who will hospitalize the patient for further evaluation and care.      The patient was reevaluated at bedside. The patient will require hospitalization. He was given an opportunity to ask questions. He is agreeable and understanding to the new plan of care.      HYDRATION: Based on the patient's presentation of Tachycardia the patient was given IV fluids. IV Hydration was used because oral hydration was not adequate alone. Upon recheck following hydration, the patient was improved.    ADDITIONAL PROBLEM LIST AND DISPOSITION  Alcohol abuse               DISPOSITION AND DISCUSSIONS  I have discussed management of the patient with the following physicians and ERIN's: Dr. Shaikh (Trauma Surgery)    Discussion of management with other hospitals or appropriate source(s): Pharmacy       Barriers to care at this time, including but not limited to: Patient does not have  established PCP.     Decision tools and prescription drugs considered including, but not limited to: Hospital admission    DISPOSITION:  Patient will be hospitalized by Dr. Shaikh (Trauma Surgery) in guarded condition.    FINAL IMPRESSION   1. Rattlesnake bite, accidental or unintentional, initial encounter      Karen ISAAC (Scribe), am scribing for, and in the presence of, Johanne David D.O..    Electronically signed by: Karen Correa ([employer]), 7/8/2025    IJohanne D.O. personally performed the services described in this documentation, as scribed by Karen Correa in my presence, and it is both accurate and complete.    The note accurately reflects work and decisions made by me.  Johanne David D.O.  7/8/2025  7:25 AM          [1] History reviewed. No pertinent past medical history.  [2] History reviewed. No pertinent surgical history.

## 2025-07-08 NOTE — ASSESSMENT & PLAN NOTE
Rattlesnake bite to right middle finger at 2120 7/7.   Swelling to right middle finger, hand, and redness up to mid/upper forearm was marked. Progression of swelling to mid upper arm.  Anavip 10 vials administered.  7/8 Two additional doses of Anavip. Last dose 1815  Monitor for progression of symptoms.  Administer additional Anavip prn.

## 2025-07-08 NOTE — H&P
"    CHIEF COMPLAINT: Snake bite.     HISTORY OF PRESENT ILLNESS: The patient is a 45 year-old White man who sustained a rattle snake bite tonight at approximately 9:30pm. There was a snake in his neighbors yard, he was trying to catch it to relocate the snake when it bite him in the right third finger. He has pain in the right hard with associated swelling. He c/o some numbness in the fingers but sensation to touch and motor are intact. He denies any other medical/surgical history. He takes no medications. No fevers/chills, nausea/vomiting, CP, or SOB.    TRIAGE CATEGORY: The patient was triaged as a Trauma Green Transfer Activation. The patient was initially evaluated at [facility] in [place], [place] where preliminary work up demonstrated a rattle snake bite. He was transported to [facility] in [place], [place] for a Trauma Surgery trauma evaluation. An expeditious primary and secondary survey with required adjuncts was conducted. See Trauma Narrator for full details.    PAST MEDICAL HISTORY:  has no past medical history on file.    PAST SURGICAL HISTORY:  has no past surgical history on file.    ALLERGIES: Allergies[1]    CURRENT MEDICATIONS:   Home Medications       Reviewed by Low Otero R.N. (Registered Nurse) on 07/08/25 at 0026  Med List Status: <None>     Medication Last Dose Status        Patient Bao Taking any Medications                         FAMILY HISTORY: family history is not on file.    SOCIAL HISTORY:  reports that he has never smoked. He has never used smokeless tobacco.    REVIEW OF SYSTEMS: Comprehensive review of systems is negative with the exception of the aforementioned HPI, PMH, and PSH bullets in accordance with CMS guidelines.    PHYSICAL EXAMINATION:      Vital Signs: /87   Pulse (!) 111   Temp 36.3 °C (97.4 °F)   Resp 18   Ht 1.778 m (5' 10\")   Wt 74.8 kg (165 lb)   SpO2 92%   Physical Exam  Vitals reviewed. "   Constitutional:       Appearance: Normal appearance.       HENT:      Head: Normocephalic.      Nose: Nose normal.      Mouth/Throat:      Mouth: Mucous membranes are moist.      Pharynx: Oropharynx is clear.   Eyes:      Conjunctiva/sclera: Conjunctivae normal.   Cardiovascular:      Rate and Rhythm: Regular rhythm. Tachycardia present.   Pulmonary:      Effort: Pulmonary effort is normal. No respiratory distress.   Abdominal:      General: Abdomen is flat. There is no distension.      Palpations: Abdomen is soft.      Tenderness: There is no abdominal tenderness.   Musculoskeletal:         General: No swelling.   Skin:     General: Skin is warm and dry.   Neurological:      General: No focal deficit present.      Mental Status: He is alert and oriented to person, place, and time.      Comments: Right hand sensation to crude touch intact  Right hand motor grossly intact   Psychiatric:         Mood and Affect: Mood normal.       LABORATORY VALUES:   Recent Labs     07/07/25  2316   RBC 5   HEMOGLOBIN 16.1   HEMATOCRIT 46.6   MCV 93.1   MCH 32.1   MCHC 34.5   RDW 12.9   PLATELETCT 196   MPV 7.9         Recent Labs     07/08/25  0012   INR 1.01     Recent Labs     07/08/25  0012   APTT 29.6   INR 1.01        IMAGING:   No orders to display       ASSESSMENT AND PLAN:   Jose Daniel Shipley is a 45 y.o. gentleman that sustained a rattlesnake bite to his right 3rd finger around 9:30 this evening. He has associated swelling, numbness, pain, and tachycardia.  - Pharmacy notified, giving Anavip know  - Poison control notified in the ED  - Admit to SICU with cardiac monitoring   - Pain control  - IVF  - q4 hour labs      DISPOSITION: Trauma ICU.  Interval Trauma tertiary survey.  _____     Jonah Shaikh M.D.    DD: 7/8/2025  12:25 AM  Injury Scoring Scale         [1] No Known Allergies

## 2025-07-08 NOTE — PROGRESS NOTES
Trauma / Surgical Daily Progress Note    Date of Service  7/8/2025    Chief Complaint  45 y.o. male admitted 7/8/2025 as a trauma green transfer - Rattlesnake bite to right middle finger      Interval Events  Tertiary complete  3 doses Anavip and progression seems to be slowing  Hx EOTH with withdrawal  Tolerating diet   Increase oxycodone     - RASS and Librium initiated  - Continue ICU care     Review of Systems  Review of Systems   Constitutional:  Positive for malaise/fatigue.   HENT: Negative.     Respiratory: Negative.     Genitourinary:         Voiding   Musculoskeletal:  Positive for myalgias.   Neurological:  Positive for sensory change (right hand).   Psychiatric/Behavioral:  Positive for substance abuse.    All other systems reviewed and are negative.       Vital Signs  Temp:  [36.3 °C (97.3 °F)-36.6 °C (97.9 °F)] 36.4 °C (97.6 °F)  Pulse:  [] 84  Resp:  [13-29] 15  BP: (100-142)/(64-98) 100/70  SpO2:  [92 %-96 %] 93 %    Physical Exam  Physical Exam  Vitals and nursing note reviewed.   Constitutional:       General: He is not in acute distress.     Appearance: Normal appearance.   HENT:      Right Ear: External ear normal.      Left Ear: External ear normal.      Nose: Nose normal.      Mouth/Throat:      Mouth: Mucous membranes are moist.      Pharynx: Oropharynx is clear.   Eyes:      General:         Right eye: No discharge.         Left eye: No discharge.      Extraocular Movements: Extraocular movements intact.   Pulmonary:      Effort: Pulmonary effort is normal. No respiratory distress.      Breath sounds: Normal breath sounds.   Abdominal:      General: There is no distension.      Palpations: Abdomen is soft.      Tenderness: There is no abdominal tenderness.   Musculoskeletal:         General: Swelling and tenderness present.      Cervical back: Normal range of motion. No rigidity. No muscular tenderness.      Comments: Right arm with puncture librado middle digit - swelling to elbow   "  Skin:     General: Skin is warm.      Capillary Refill: Capillary refill takes less than 2 seconds.   Neurological:      General: No focal deficit present.      Mental Status: He is alert and oriented to person, place, and time.      Comments: Tremulous    Psychiatric:         Mood and Affect: Mood normal.         Behavior: Behavior normal.         Thought Content: Thought content normal.         Core Measures & Quality Metrics  Medications reviewed, Labs reviewed and Radiology images reviewed  Love catheter: No Love      DVT Prophylaxis: Enoxaparin (Lovenox)  DVT prophylaxis - mechanical: SCDs  Ulcer prophylaxis: Not indicated    Assessed for rehab: Patient was assess for and/or received rehabilitation services during this hospitalization    MARIBEL Score   Blood Alcohol>0.08: yes CAGE Score: 4  Total: POSITIVE  Assessment complete date: 7/8/2025  Intervention: Complete. Patient response to intervention: \"I drink\".   Patient demonstrates understanding of intervention. Patient agrees to follow-up.   has been contacted. Follow up with: PCP  Total ETOH intervention time: 15 - 30 mintues      Assessment/Plan  * Snake bite, initial encounter- (present on admission)  Assessment & Plan  Rattlesnake bite to right middle finger at 2120 7/7.   Swelling to right middle finger, hand, and redness up to mid/upper forearm was marked. Progression of swelling to mid upper arm.  Anavip 10 vials administered.  7/8 Two additional doses of Anavip  Monitor for progression of symptoms.  Administer additional Anavip prn.    No contraindication to deep vein thrombosis (DVT) prophylaxis- (present on admission)  Assessment & Plan  Prophylactic dose enoxaparin 40 mg BID initiated upon admission.    Acute alcohol intoxication (HCC)- (present on admission)  Assessment & Plan  Admission blood alcohol level of 205.  Trauma alcohol withdrawal protocol initiated.  Alcohol withdrawal surveillance.  SBIRT complete.  Librium and RASS " intiated.    Trauma- (present on admission)  Assessment & Plan  Rattlesnake bite to right middle finger.  Trauma Green Transfer Activation from [facility] in [place], [place].  Jonah Shaikh MD. Trauma Surgery.      Mental status adequate for full examination?: Yes    Spine cleared (radiologically and/or clinically): Yes    All current laboratory studies/radiology exams reviewed: Yes    Medications reconciliation has been reviewed: Yes    Completed Consultations:  None     Pending Consultations:  None    Newly identified diagnoses, injuries and/or co-morbidities:  None    PDI None

## 2025-07-08 NOTE — PROGRESS NOTES
1530:  RN at bedside to assess patient.  Gloria NP at bedside to assess R. UE swelling also.  Progression of swelling noted up bicep.  Patient is complaining of shoulder pain (forearm pain has improved).  R. Posterior hand continues to be numb but slightly improved per patient report.     Debbi Pharm D at bedside to assess R. UE.  Dr. Moss aware.  Plan to re-dose anavip.     1615:  Pharmacy team discussing dosing/plan of care with Poison control.  Small increase in R. Bicep swelling noted.

## 2025-07-08 NOTE — PROGRESS NOTES
Discussed patient's symptoms,swelling and coags with Debbi Pharm D.  Plan to administer additional dose of Anavip.

## 2025-07-08 NOTE — ED NOTES
Anitvenom and NS started per MAR  2nd PIV established and COD confirmation and green top recollect collected and sent.  Patient resting comfortably, resp even and unlabored, NAD, and no needs at this time.  Fall safety precautions in place per policy.

## 2025-07-08 NOTE — PROGRESS NOTES
Med rec is complete per Patient at bedside.     Allergies reviewed.    Has patient had any outside antibiotics in the last 30 days? N    Antibiotics: nitrofurantoin, doxycycline, sulfamethoxazole-trimethoprim?N    Antiparasitics include: albendazole, ivermectin, pyrantel pamoate (OTC), mebendazole and metronidazole?N  N  Antivirals: acyclovir, valacyclovir?N    Antifungals: voriconazole, posaconazole, and isavuconazonium (Cresemba®)?N       Any Anticoagulants (rivaroxaban, apixaban, edoxaban, dabigatran, warfarin, enoxaparin) taken in the last 14 days? N     Pharmacy/Pharmacies Pt utilizes : [facility] [place] [phone]

## 2025-07-09 LAB
ALBUMIN SERPL BCP-MCNC: 3.2 G/DL (ref 3.2–4.9)
ALBUMIN SERPL BCP-MCNC: 3.2 G/DL (ref 3.2–4.9)
ALBUMIN/GLOB SERPL: 1.2 G/DL
ALBUMIN/GLOB SERPL: 1.3 G/DL
ALP SERPL-CCNC: 71 U/L (ref 30–99)
ALP SERPL-CCNC: 83 U/L (ref 30–99)
ALT SERPL-CCNC: 9 U/L (ref 2–50)
ALT SERPL-CCNC: 9 U/L (ref 2–50)
ANION GAP SERPL CALC-SCNC: 8 MMOL/L (ref 7–16)
ANION GAP SERPL CALC-SCNC: 9 MMOL/L (ref 7–16)
AST SERPL-CCNC: 22 U/L (ref 12–45)
AST SERPL-CCNC: 25 U/L (ref 12–45)
BILIRUB SERPL-MCNC: 0.9 MG/DL (ref 0.1–1.5)
BILIRUB SERPL-MCNC: 1 MG/DL (ref 0.1–1.5)
BUN SERPL-MCNC: 10 MG/DL (ref 8–22)
BUN SERPL-MCNC: 8 MG/DL (ref 8–22)
CALCIUM ALBUM COR SERPL-MCNC: 8.8 MG/DL (ref 8.5–10.5)
CALCIUM ALBUM COR SERPL-MCNC: 8.9 MG/DL (ref 8.5–10.5)
CALCIUM SERPL-MCNC: 8.2 MG/DL (ref 8.5–10.5)
CALCIUM SERPL-MCNC: 8.3 MG/DL (ref 8.5–10.5)
CFT BLD TEG: 3.7 MIN (ref 4.6–9.1)
CFT BLD TEG: 5.5 MIN (ref 4.6–9.1)
CFT BLD TEG: NORMAL MIN (ref 4.6–9.1)
CFT P HPASE BLD TEG: 3.3 MIN (ref 4.3–8.3)
CFT P HPASE BLD TEG: 4.3 MIN (ref 4.3–8.3)
CFT P HPASE BLD TEG: NORMAL MIN (ref 4.3–8.3)
CHLORIDE SERPL-SCNC: 105 MMOL/L (ref 96–112)
CHLORIDE SERPL-SCNC: 107 MMOL/L (ref 96–112)
CLOT ANGLE BLD TEG: 70.5 DEGREES (ref 63–78)
CLOT ANGLE BLD TEG: 73 DEGREES (ref 63–78)
CLOT ANGLE BLD TEG: NORMAL DEGREES (ref 63–78)
CLOT LYSIS 30M P MA LENFR BLD TEG: NORMAL % (ref 0–2.6)
CO2 SERPL-SCNC: 20 MMOL/L (ref 20–33)
CO2 SERPL-SCNC: 21 MMOL/L (ref 20–33)
CREAT SERPL-MCNC: 0.59 MG/DL (ref 0.5–1.4)
CREAT SERPL-MCNC: 0.61 MG/DL (ref 0.5–1.4)
CT.EXTRINSIC BLD ROTEM: 1.4 MIN (ref 0.8–2.1)
CT.EXTRINSIC BLD ROTEM: 1.5 MIN (ref 0.8–2.1)
CT.EXTRINSIC BLD ROTEM: NORMAL MIN (ref 0.8–2.1)
ERYTHROCYTE [DISTWIDTH] IN BLOOD BY AUTOMATED COUNT: 44.8 FL (ref 35.9–50)
ERYTHROCYTE [DISTWIDTH] IN BLOOD BY AUTOMATED COUNT: 44.9 FL (ref 35.9–50)
ERYTHROCYTE [DISTWIDTH] IN BLOOD BY AUTOMATED COUNT: NORMAL FL (ref 35.9–50)
FIBRINOGEN PPP-MCNC: 277 MG/DL (ref 215–460)
FIBRINOGEN PPP-MCNC: 292 MG/DL (ref 215–460)
FIBRINOGEN PPP-MCNC: 296 MG/DL (ref 215–460)
FIBRINOGEN PPP-MCNC: NORMAL MG/DL (ref 215–460)
GFR SERPLBLD CREATININE-BSD FMLA CKD-EPI: 120 ML/MIN/1.73 M 2
GFR SERPLBLD CREATININE-BSD FMLA CKD-EPI: 121 ML/MIN/1.73 M 2
GLOBULIN SER CALC-MCNC: 2.5 G/DL (ref 1.9–3.5)
GLOBULIN SER CALC-MCNC: 2.7 G/DL (ref 1.9–3.5)
GLUCOSE SERPL-MCNC: 103 MG/DL (ref 65–99)
GLUCOSE SERPL-MCNC: 85 MG/DL (ref 65–99)
HCT VFR BLD AUTO: 36.9 % (ref 42–52)
HCT VFR BLD AUTO: 37.2 % (ref 42–52)
HCT VFR BLD AUTO: NORMAL % (ref 42–52)
HGB BLD-MCNC: 12.2 G/DL (ref 14–18)
HGB BLD-MCNC: 12.2 G/DL (ref 14–18)
HGB BLD-MCNC: NORMAL G/DL (ref 14–18)
INR PPP: 1 (ref 0.87–1.13)
INR PPP: 1.01 (ref 0.87–1.13)
INR PPP: 1.04 (ref 0.87–1.13)
INR PPP: NORMAL (ref 0.87–1.13)
MCF BLD TEG: 52.8 MM (ref 52–69)
MCF BLD TEG: 53 MM (ref 52–69)
MCF BLD TEG: NORMAL MM (ref 52–69)
MCF.PLATELET INHIB BLD ROTEM: 16.8 MM (ref 15–32)
MCF.PLATELET INHIB BLD ROTEM: 16.8 MM (ref 15–32)
MCF.PLATELET INHIB BLD ROTEM: NORMAL MM (ref 15–32)
MCH RBC QN AUTO: 31.7 PG (ref 27–33)
MCH RBC QN AUTO: 31.8 PG (ref 27–33)
MCH RBC QN AUTO: NORMAL PG (ref 27–33)
MCHC RBC AUTO-ENTMCNC: 32.8 G/DL (ref 32.3–36.5)
MCHC RBC AUTO-ENTMCNC: 33.1 G/DL (ref 32.3–36.5)
MCHC RBC AUTO-ENTMCNC: NORMAL G/DL (ref 32.3–36.5)
MCV RBC AUTO: 95.8 FL (ref 81.4–97.8)
MCV RBC AUTO: 96.9 FL (ref 81.4–97.8)
MCV RBC AUTO: NORMAL FL (ref 81.4–97.8)
PA AA BLD-ACNC: 25.5 % (ref 0–11)
PA AA BLD-ACNC: 47.5 % (ref 0–11)
PA AA BLD-ACNC: NORMAL % (ref 0–11)
PA ADP BLD-ACNC: 77.9 % (ref 0–17)
PA ADP BLD-ACNC: 86.2 % (ref 0–17)
PA ADP BLD-ACNC: NORMAL % (ref 0–17)
PLATELET # BLD AUTO: 150 K/UL (ref 164–446)
PLATELET # BLD AUTO: 173 K/UL (ref 164–446)
PLATELET # BLD AUTO: NORMAL K/UL (ref 164–446)
PMV BLD AUTO: 10 FL (ref 9–12.9)
PMV BLD AUTO: 9.9 FL (ref 9–12.9)
PMV BLD AUTO: NORMAL FL (ref 9–12.9)
POTASSIUM SERPL-SCNC: 3.6 MMOL/L (ref 3.6–5.5)
POTASSIUM SERPL-SCNC: 3.9 MMOL/L (ref 3.6–5.5)
PROT SERPL-MCNC: 5.7 G/DL (ref 6–8.2)
PROT SERPL-MCNC: 5.9 G/DL (ref 6–8.2)
PROTHROMBIN TIME: 13.2 SEC (ref 12–14.6)
PROTHROMBIN TIME: 13.3 SEC (ref 12–14.6)
PROTHROMBIN TIME: 13.7 SEC (ref 12–14.6)
PROTHROMBIN TIME: NORMAL SEC (ref 12–14.6)
RBC # BLD AUTO: 3.84 M/UL (ref 4.7–6.1)
RBC # BLD AUTO: 3.85 M/UL (ref 4.7–6.1)
RBC # BLD AUTO: NORMAL M/UL (ref 4.7–6.1)
SODIUM SERPL-SCNC: 134 MMOL/L (ref 135–145)
SODIUM SERPL-SCNC: 136 MMOL/L (ref 135–145)
TEG ALGORITHM TGALG: ABNORMAL
TEG ALGORITHM TGALG: ABNORMAL
TEG ALGORITHM TGALG: NORMAL
WBC # BLD AUTO: 5.1 K/UL (ref 4.8–10.8)
WBC # BLD AUTO: 6.5 K/UL (ref 4.8–10.8)
WBC # BLD AUTO: NORMAL K/UL (ref 4.8–10.8)

## 2025-07-09 PROCEDURE — 770001 HCHG ROOM/CARE - MED/SURG/GYN PRIV*

## 2025-07-09 PROCEDURE — 700111 HCHG RX REV CODE 636 W/ 250 OVERRIDE (IP): Mod: JZ | Performed by: STUDENT IN AN ORGANIZED HEALTH CARE EDUCATION/TRAINING PROGRAM

## 2025-07-09 PROCEDURE — 85347 COAGULATION TIME ACTIVATED: CPT

## 2025-07-09 PROCEDURE — A9270 NON-COVERED ITEM OR SERVICE: HCPCS | Performed by: NURSE PRACTITIONER

## 2025-07-09 PROCEDURE — 700102 HCHG RX REV CODE 250 W/ 637 OVERRIDE(OP): Performed by: STUDENT IN AN ORGANIZED HEALTH CARE EDUCATION/TRAINING PROGRAM

## 2025-07-09 PROCEDURE — A9270 NON-COVERED ITEM OR SERVICE: HCPCS | Performed by: SURGERY

## 2025-07-09 PROCEDURE — 85027 COMPLETE CBC AUTOMATED: CPT | Mod: 91

## 2025-07-09 PROCEDURE — 85384 FIBRINOGEN ACTIVITY: CPT | Mod: 91

## 2025-07-09 PROCEDURE — 85384 FIBRINOGEN ACTIVITY: CPT

## 2025-07-09 PROCEDURE — 85576 BLOOD PLATELET AGGREGATION: CPT | Mod: 91

## 2025-07-09 PROCEDURE — 85610 PROTHROMBIN TIME: CPT | Mod: 91

## 2025-07-09 PROCEDURE — 700102 HCHG RX REV CODE 250 W/ 637 OVERRIDE(OP): Performed by: NURSE PRACTITIONER

## 2025-07-09 PROCEDURE — 80053 COMPREHEN METABOLIC PANEL: CPT

## 2025-07-09 PROCEDURE — 700102 HCHG RX REV CODE 250 W/ 637 OVERRIDE(OP): Performed by: SURGERY

## 2025-07-09 PROCEDURE — A9270 NON-COVERED ITEM OR SERVICE: HCPCS | Performed by: STUDENT IN AN ORGANIZED HEALTH CARE EDUCATION/TRAINING PROGRAM

## 2025-07-09 RX ORDER — POTASSIUM CHLORIDE 1500 MG/1
40 TABLET, EXTENDED RELEASE ORAL ONCE
Status: COMPLETED | OUTPATIENT
Start: 2025-07-09 | End: 2025-07-09

## 2025-07-09 RX ADMIN — DOCUSATE SODIUM 100 MG: 100 CAPSULE, LIQUID FILLED ORAL at 18:34

## 2025-07-09 RX ADMIN — CHLORDIAZEPOXIDE HYDROCHLORIDE 25 MG: 25 CAPSULE ORAL at 18:34

## 2025-07-09 RX ADMIN — GABAPENTIN 300 MG: 300 CAPSULE ORAL at 20:27

## 2025-07-09 RX ADMIN — POTASSIUM CHLORIDE 40 MEQ: 1500 TABLET, EXTENDED RELEASE ORAL at 08:13

## 2025-07-09 RX ADMIN — CHLORDIAZEPOXIDE HYDROCHLORIDE 25 MG: 25 CAPSULE ORAL at 12:01

## 2025-07-09 RX ADMIN — CHLORDIAZEPOXIDE HYDROCHLORIDE 25 MG: 25 CAPSULE ORAL at 23:53

## 2025-07-09 RX ADMIN — GABAPENTIN 300 MG: 300 CAPSULE ORAL at 13:31

## 2025-07-09 RX ADMIN — OXYCODONE 5 MG: 5 TABLET ORAL at 20:33

## 2025-07-09 RX ADMIN — ACETAMINOPHEN 1000 MG: 500 TABLET ORAL at 00:00

## 2025-07-09 RX ADMIN — OXYCODONE 5 MG: 5 TABLET ORAL at 03:59

## 2025-07-09 RX ADMIN — GABAPENTIN 300 MG: 300 CAPSULE ORAL at 05:32

## 2025-07-09 RX ADMIN — ACETAMINOPHEN 1000 MG: 500 TABLET ORAL at 05:31

## 2025-07-09 RX ADMIN — ACETAMINOPHEN 1000 MG: 500 TABLET ORAL at 12:00

## 2025-07-09 RX ADMIN — ENOXAPARIN SODIUM 40 MG: 100 INJECTION SUBCUTANEOUS at 18:33

## 2025-07-09 RX ADMIN — ACETAMINOPHEN 1000 MG: 500 TABLET ORAL at 18:34

## 2025-07-09 RX ADMIN — CHLORDIAZEPOXIDE HYDROCHLORIDE 50 MG: 25 CAPSULE ORAL at 00:00

## 2025-07-09 RX ADMIN — ACETAMINOPHEN 1000 MG: 500 TABLET ORAL at 23:53

## 2025-07-09 RX ADMIN — CHLORDIAZEPOXIDE HYDROCHLORIDE 50 MG: 25 CAPSULE ORAL at 05:31

## 2025-07-09 ASSESSMENT — LIFESTYLE VARIABLES
NAUSEA AND VOMITING: NO NAUSEA AND NO VOMITING
PAROXYSMAL SWEATS: NO SWEAT VISIBLE
AUDITORY DISTURBANCES: NOT PRESENT
ANXIETY: NO ANXIETY (AT EASE)
TOTAL SCORE: 0
ORIENTATION AND CLOUDING OF SENSORIUM: ORIENTED AND CAN DO SERIAL ADDITIONS
AGITATION: NORMAL ACTIVITY
HEADACHE, FULLNESS IN HEAD: NOT PRESENT
VISUAL DISTURBANCES: NOT PRESENT
TREMOR: NO TREMOR

## 2025-07-09 ASSESSMENT — ENCOUNTER SYMPTOMS
TREMORS: 0
VOMITING: 0
SENSORY CHANGE: 0
MYALGIAS: 1
NECK PAIN: 0
CHILLS: 0
NAUSEA: 0
BACK PAIN: 0
TINGLING: 1
ABDOMINAL PAIN: 0
NERVOUS/ANXIOUS: 0
FEVER: 0

## 2025-07-09 ASSESSMENT — PAIN DESCRIPTION - PAIN TYPE
TYPE: ACUTE PAIN

## 2025-07-09 NOTE — CARE PLAN
The patient is Stable - Low risk of patient condition declining or worsening    Shift Goals  Clinical Goals: Monitor RUE swelling q2h, trend labs Q4h, rest  Patient Goals: pain management  Family Goals: DONALDO    Progress made toward(s) clinical / shift goals:    Problem: Pain - Standard  Goal: Alleviation of pain or a reduction in pain to the patient’s comfort goal  Outcome: Progressing     Problem: Optimal Care for Alcohol Withdrawal  Goal: Optimal Care for the alcohol withdrawal patient  Outcome: Progressing     Problem: Psychosocial  Goal: Patient's level of anxiety will decrease  Outcome: Progressing       Patient is not progressing towards the following goals:

## 2025-07-09 NOTE — CARE PLAN
The patient is Watcher - Medium risk of patient condition declining or worsening    Shift Goals  Clinical Goals: monitor R. UE swelling q 2 hours, trend labs q 4hours  Patient Goals: decreased pain  Family Goals: no family present    Progress made toward(s) clinical / shift goals:  Antavip administered x2 doses this shift for progression of swelling. Patient has consistently had numbness to posterior R. Hand.  Good ROM.  Pain well controlled.     Problem: Pain - Standard  Goal: Alleviation of pain or a reduction in pain to the patient’s comfort goal  Outcome: Progressing     Patient is not progressing towards the following goals:

## 2025-07-09 NOTE — CARE PLAN
The patient is Watcher - Medium risk of patient condition declining or worsening    Shift Goals  Clinical Goals: Monitor RUE swelling q2h, trend labs Q4h, rest  Patient Goals: pain management  Family Goals: DONALDO    Progress made toward(s) clinical / shift goals:    Problem: Knowledge Deficit - Standard  Goal: Patient and family/care givers will demonstrate understanding of plan of care, disease process/condition, diagnostic tests and medications  Outcome: Progressing     Problem: Pain - Standard  Goal: Alleviation of pain or a reduction in pain to the patient’s comfort goal  Outcome: Progressing     Problem: Optimal Care for Alcohol Withdrawal  Goal: Optimal Care for the alcohol withdrawal patient  Outcome: Progressing     Problem: Seizure Precautions  Goal: Implementation of seizure precautions  Outcome: Progressing     Problem: Psychosocial  Goal: Patient's level of anxiety will decrease  Outcome: Progressing  Goal: Spiritual and cultural needs incorporated into hospitalization  Outcome: Progressing     Problem: Risk for Aspiration  Goal: Patient's risk for aspiration will be absent or decrease  Outcome: Progressing

## 2025-07-09 NOTE — PROGRESS NOTES
Trauma / Surgical Daily Progress Note    Date of Service  7/9/2025    Chief Complaint  45 y.o. male admitted 7/8/2025 with snake bite    Interval Events  No acute overnight events  Hgb 12.2 (12.2)  Last dose antivenom 1815 7/8  Swelling improving  AA 47.5 % inhibition (25.5 %)  Librium taper ongoing   Tolerating diet    - Continue to monitor symptoms post last antivenom administration   - Anticipate DC home in next 24 hours if symptoms remain improving  - Medically cleared for transfer to [facility]    Review of Systems  Review of Systems   Constitutional:  Negative for chills and fever.   Cardiovascular:  Negative for chest pain.   Gastrointestinal:  Negative for abdominal pain, nausea and vomiting.   Genitourinary:  Negative for dysuria.   Musculoskeletal:  Positive for myalgias. Negative for back pain and neck pain.   Neurological:  Positive for tingling. Negative for tremors and sensory change.   Psychiatric/Behavioral:  The patient is not nervous/anxious.         Vital Signs  Temp:  [36.1 °C (96.9 °F)-36.6 °C (97.8 °F)] 36.1 °C (96.9 °F)  Pulse:  [] 84  Resp:  [10-26] 20  BP: ()/(60-75) 112/69  SpO2:  [93 %-98 %] 95 %    Physical Exam  Physical Exam  Vitals and nursing note reviewed.   Constitutional:       General: He is not in acute distress.     Appearance: Normal appearance. He is not ill-appearing or toxic-appearing.   HENT:      Head: Normocephalic.      Right Ear: External ear normal.      Left Ear: External ear normal.      Nose: Nose normal.   Eyes:      General:         Right eye: No discharge.         Left eye: No discharge.   Cardiovascular:      Rate and Rhythm: Normal rate.   Pulmonary:      Effort: Pulmonary effort is normal. No respiratory distress.   Abdominal:      General: Abdomen is flat. There is no distension.      Palpations: Abdomen is soft.      Tenderness: There is no abdominal tenderness. There is no guarding.   Musculoskeletal:         General: Normal range of motion.       Comments: Minimal swelling to dorsum of right hand. Bite librado noted to right middle finger. No swelling into forearm, upper arm. Minimal tingling to posterior hand.    Skin:     General: Skin is warm and dry.      Capillary Refill: Capillary refill takes less than 2 seconds.   Neurological:      General: No focal deficit present.      Mental Status: He is alert and oriented to person, place, and time.   Psychiatric:         Mood and Affect: Mood normal.         Behavior: Behavior normal.         Laboratory  Recent Results (from the past 24 hours)   CBC WITHOUT DIFFERENTIAL EVERY 4 HOURS    Collection Time: 07/08/25  3:35 PM   Result Value Ref Range    WBC 7.6 4.8 - 10.8 K/uL    RBC 4.08 (L) 4.70 - 6.10 M/uL    Hemoglobin 13.1 (L) 14.0 - 18.0 g/dL    Hematocrit 38.2 (L) 42.0 - 52.0 %    MCV 93.6 81.4 - 97.8 fL    MCH 32.1 27.0 - 33.0 pg    MCHC 34.3 32.3 - 36.5 g/dL    RDW 43.9 35.9 - 50.0 fL    Platelet Count 180 164 - 446 K/uL    MPV 9.7 9.0 - 12.9 fL   PT/INR EVERY 4 HOURS    Collection Time: 07/08/25  3:35 PM   Result Value Ref Range    PT 13.5 12.0 - 14.6 sec    INR 1.03 0.87 - 1.13   FIBRINOGEN    Collection Time: 07/08/25  3:35 PM   Result Value Ref Range    Fibrinogen 288 215 - 460 mg/dL   CBC WITHOUT DIFFERENTIAL EVERY 4 HOURS    Collection Time: 07/08/25  7:40 PM   Result Value Ref Range    WBC 7.7 4.8 - 10.8 K/uL    RBC 4.12 (L) 4.70 - 6.10 M/uL    Hemoglobin 13.3 (L) 14.0 - 18.0 g/dL    Hematocrit 38.7 (L) 42.0 - 52.0 %    MCV 93.9 81.4 - 97.8 fL    MCH 32.3 27.0 - 33.0 pg    MCHC 34.4 32.3 - 36.5 g/dL    RDW 43.6 35.9 - 50.0 fL    Platelet Count 177 164 - 446 K/uL    MPV 9.9 9.0 - 12.9 fL   PT/INR EVERY 4 HOURS    Collection Time: 07/08/25  7:40 PM   Result Value Ref Range    PT 13.3 12.0 - 14.6 sec    INR 1.01 0.87 - 1.13   FIBRINOGEN    Collection Time: 07/08/25  7:40 PM   Result Value Ref Range    Fibrinogen 234 215 - 460 mg/dL   CBC WITHOUT DIFFERENTIAL    Collection Time: 07/09/25 12:00 AM   Result  Value Ref Range    WBC 6.5 4.8 - 10.8 K/uL    RBC 3.85 (L) 4.70 - 6.10 M/uL    Hemoglobin 12.2 (L) 14.0 - 18.0 g/dL    Hematocrit 36.9 (L) 42.0 - 52.0 %    MCV 95.8 81.4 - 97.8 fL    MCH 31.7 27.0 - 33.0 pg    MCHC 33.1 32.3 - 36.5 g/dL    RDW 44.9 35.9 - 50.0 fL    Platelet Count 173 164 - 446 K/uL    MPV 10.0 9.0 - 12.9 fL   Prothrombin Time    Collection Time: 07/09/25 12:00 AM   Result Value Ref Range    PT 13.2 12.0 - 14.6 sec    INR 1.00 0.87 - 1.13   FIBRINOGEN    Collection Time: 07/09/25 12:00 AM   Result Value Ref Range    Fibrinogen 277 215 - 460 mg/dL   PLATELET MAPPING WITH BASIC TEG    Collection Time: 07/09/25 12:00 AM   Result Value Ref Range    Reaction Time Initial-R 3.7 (L) 4.6 - 9.1 min    React Time Initial Hep 3.3 (L) 4.3 - 8.3 min    Clot Kinetics-K 1.4 0.8 - 2.1 min    Clot Angle-Angle 73.0 63.0 - 78.0 degrees    Maximum Clot Strength-MA 53.0 52.0 - 69.0 mm    TEG Functional Fibrinogen(MA) 16.8 15.0 - 32.0 mm    % Inhibition ADP 77.9 (H) 0.0 - 17.0 %    % Inhibition AA 25.5 (H) 0.0 - 11.0 %    TEG Algorithm Link Algorithm    Comp Metabolic Panel    Collection Time: 07/09/25 12:00 AM   Result Value Ref Range    Sodium 134 (L) 135 - 145 mmol/L    Potassium 3.9 3.6 - 5.5 mmol/L    Chloride 105 96 - 112 mmol/L    Co2 21 20 - 33 mmol/L    Anion Gap 8.0 7.0 - 16.0    Glucose 103 (H) 65 - 99 mg/dL    Bun 10 8 - 22 mg/dL    Creatinine 0.61 0.50 - 1.40 mg/dL    Calcium 8.3 (L) 8.5 - 10.5 mg/dL    Correct Calcium 8.9 8.5 - 10.5 mg/dL    AST(SGOT) 25 12 - 45 U/L    ALT(SGPT) 9 2 - 50 U/L    Alkaline Phosphatase 83 30 - 99 U/L    Total Bilirubin 0.9 0.1 - 1.5 mg/dL    Albumin 3.2 3.2 - 4.9 g/dL    Total Protein 5.9 (L) 6.0 - 8.2 g/dL    Globulin 2.7 1.9 - 3.5 g/dL    A-G Ratio 1.2 g/dL   ESTIMATED GFR    Collection Time: 07/09/25 12:00 AM   Result Value Ref Range    GFR (CKD-EPI) 120 >60 mL/min/1.73 m 2   CBC WITHOUT DIFFERENTIAL    Collection Time: 07/09/25  3:40 AM   Result Value Ref Range    WBC RR  4.8 - 10.8 K/uL    RBC RR 4.70 - 6.10 M/uL    Hemoglobin RR 14.0 - 18.0 g/dL    Hematocrit RR 42.0 - 52.0 %    MCV RR 81.4 - 97.8 fL    MCH RR 27.0 - 33.0 pg    MCHC RR 32.3 - 36.5 g/dL    RDW RR 35.9 - 50.0 fL    Platelet Count  - 446 K/uL    MPV RR 9.0 - 12.9 fL   Prothrombin Time    Collection Time: 07/09/25  3:40 AM   Result Value Ref Range    PT RR 12.0 - 14.6 sec    INR RR 0.87 - 1.13   FIBRINOGEN    Collection Time: 07/09/25  3:40 AM   Result Value Ref Range    Fibrinogen  - 460 mg/dL   PLATELET MAPPING WITH BASIC TEG    Collection Time: 07/09/25  3:40 AM   Result Value Ref Range    Reaction Time Initial-R RR 4.6 - 9.1 min    React Time Initial Hep RR 4.3 - 8.3 min    Clot Kinetics-K RR 0.8 - 2.1 min    Clot Angle-Angle RR 63.0 - 78.0 degrees    Maximum Clot Strength-MA RR 52.0 - 69.0 mm    TEG Functional Fibrinogen(MA) RR 15.0 - 32.0 mm    Lysis 30 minutes-LY30 RR 0.0 - 2.6 %    % Inhibition ADP RR 0.0 - 17.0 %    % Inhibition AA RR 0.0 - 11.0 %    TEG Algorithm RR    Comp Metabolic Panel    Collection Time: 07/09/25  5:10 AM   Result Value Ref Range    Sodium 136 135 - 145 mmol/L    Potassium 3.6 3.6 - 5.5 mmol/L    Chloride 107 96 - 112 mmol/L    Co2 20 20 - 33 mmol/L    Anion Gap 9.0 7.0 - 16.0    Glucose 85 65 - 99 mg/dL    Bun 8 8 - 22 mg/dL    Creatinine 0.59 0.50 - 1.40 mg/dL    Calcium 8.2 (L) 8.5 - 10.5 mg/dL    Correct Calcium 8.8 8.5 - 10.5 mg/dL    AST(SGOT) 22 12 - 45 U/L    ALT(SGPT) 9 2 - 50 U/L    Alkaline Phosphatase 71 30 - 99 U/L    Total Bilirubin 1.0 0.1 - 1.5 mg/dL    Albumin 3.2 3.2 - 4.9 g/dL    Total Protein 5.7 (L) 6.0 - 8.2 g/dL    Globulin 2.5 1.9 - 3.5 g/dL    A-G Ratio 1.3 g/dL   CBC WITHOUT DIFFERENTIAL    Collection Time: 07/09/25  5:10 AM   Result Value Ref Range    WBC 5.1 4.8 - 10.8 K/uL    RBC 3.84 (L) 4.70 - 6.10 M/uL    Hemoglobin 12.2 (L) 14.0 - 18.0 g/dL    Hematocrit 37.2 (L) 42.0 - 52.0 %    MCV 96.9 81.4 - 97.8 fL    MCH 31.8 27.0 - 33.0 pg    MCHC 32.8  "32.3 - 36.5 g/dL    RDW 44.8 35.9 - 50.0 fL    Platelet Count 150 (L) 164 - 446 K/uL    MPV 9.9 9.0 - 12.9 fL   Prothrombin Time    Collection Time: 07/09/25  5:10 AM   Result Value Ref Range    PT 13.7 12.0 - 14.6 sec    INR 1.04 0.87 - 1.13   FIBRINOGEN    Collection Time: 07/09/25  5:10 AM   Result Value Ref Range    Fibrinogen 296 215 - 460 mg/dL   PLATELET MAPPING WITH BASIC TEG    Collection Time: 07/09/25  5:10 AM   Result Value Ref Range    Reaction Time Initial-R 5.5 4.6 - 9.1 min    React Time Initial Hep 4.3 4.3 - 8.3 min    Clot Kinetics-K 1.5 0.8 - 2.1 min    Clot Angle-Angle 70.5 63.0 - 78.0 degrees    Maximum Clot Strength-MA 52.8 52.0 - 69.0 mm    TEG Functional Fibrinogen(MA) 16.8 15.0 - 32.0 mm    % Inhibition ADP 86.2 (H) 0.0 - 17.0 %    % Inhibition AA 47.5 (H) 0.0 - 11.0 %    TEG Algorithm Link Algorithm    ESTIMATED GFR    Collection Time: 07/09/25  5:10 AM   Result Value Ref Range    GFR (CKD-EPI) 121 >60 mL/min/1.73 m 2       Fluids    Intake/Output Summary (Last 24 hours) at 7/9/2025 1234  Last data filed at 7/9/2025 0400  Gross per 24 hour   Intake 3262.28 ml   Output 1125 ml   Net 2137.28 ml       Core Measures & Quality Metrics  Labs reviewed, Radiology images reviewed and Medications reviewed  Love catheter: No Love      DVT Prophylaxis: Enoxaparin (Lovenox)  DVT prophylaxis - mechanical: SCDs  Ulcer prophylaxis: No    Assessed for rehab: Patient was assess for and/or received rehabilitation services during this hospitalization    RAP Score Total: 4     Blood Alcohol>0.08: yes CAGE Score: 4  Total: POSITIVE  Assessment complete date: 7/8/2025  Intervention: Complete. Patient response to intervention: \"I drink\".   Patient demonstrates understanding of intervention. Patient agrees to follow-up.   has been contacted. Follow up with: PCP  Total ETOH intervention time: 15 - 30 mintues      Assessment/Plan  * Snake bite, initial encounter- (present on admission)  Assessment & " Plan  Rattlesnake bite to right middle finger at 2120 7/7.   Swelling to right middle finger, hand, and redness up to mid/upper forearm was marked. Progression of swelling to mid upper arm.  Anavip 10 vials administered.  7/8 Two additional doses of Anavip. Last dose 1815  Monitor for progression of symptoms.  Administer additional Anavip prn.    No contraindication to deep vein thrombosis (DVT) prophylaxis- (present on admission)  Assessment & Plan  Prophylactic dose enoxaparin 40 mg BID initiated upon admission.    Acute alcohol intoxication (HCC)- (present on admission)  Assessment & Plan  Admission blood alcohol level of 205.  Trauma alcohol withdrawal protocol initiated.  Alcohol withdrawal surveillance.  SBIRT complete.  Librium taper ongoing    Trauma- (present on admission)  Assessment & Plan  Rattlesnake bite to right middle finger.  Trauma Green Transfer Activation from [facility] in [place], [place].  Jonah Shaikh MD. Trauma Surgery.        Discussed patient condition with RN, Patient, and trauma surgery Dr Moss.

## 2025-07-09 NOTE — PROGRESS NOTES
Bedside report given to Jed BOOTHE.   1 hour post Anavip:   No further swelling noted beyond leading edge. Numbness, tingling and swelling to R. hand- unchanged.   Jed Reyes RN will draw follow up coags.

## 2025-07-10 VITALS
WEIGHT: 160.72 LBS | SYSTOLIC BLOOD PRESSURE: 142 MMHG | TEMPERATURE: 97.5 F | RESPIRATION RATE: 18 BRPM | BODY MASS INDEX: 23.01 KG/M2 | OXYGEN SATURATION: 100 % | HEART RATE: 64 BPM | DIASTOLIC BLOOD PRESSURE: 79 MMHG | HEIGHT: 70 IN

## 2025-07-10 LAB
ALBUMIN SERPL BCP-MCNC: 3.6 G/DL (ref 3.2–4.9)
ALBUMIN/GLOB SERPL: 1.3 G/DL
ALP SERPL-CCNC: 73 U/L (ref 30–99)
ALT SERPL-CCNC: 13 U/L (ref 2–50)
ANION GAP SERPL CALC-SCNC: 10 MMOL/L (ref 7–16)
AST SERPL-CCNC: 23 U/L (ref 12–45)
BILIRUB SERPL-MCNC: 0.6 MG/DL (ref 0.1–1.5)
BUN SERPL-MCNC: 8 MG/DL (ref 8–22)
CALCIUM ALBUM COR SERPL-MCNC: 9.2 MG/DL (ref 8.5–10.5)
CALCIUM SERPL-MCNC: 8.9 MG/DL (ref 8.5–10.5)
CHLORIDE SERPL-SCNC: 102 MMOL/L (ref 96–112)
CO2 SERPL-SCNC: 24 MMOL/L (ref 20–33)
CREAT SERPL-MCNC: 0.64 MG/DL (ref 0.5–1.4)
ERYTHROCYTE [DISTWIDTH] IN BLOOD BY AUTOMATED COUNT: 44.6 FL (ref 35.9–50)
GFR SERPLBLD CREATININE-BSD FMLA CKD-EPI: 118 ML/MIN/1.73 M 2
GLOBULIN SER CALC-MCNC: 2.7 G/DL (ref 1.9–3.5)
GLUCOSE SERPL-MCNC: 90 MG/DL (ref 65–99)
HCT VFR BLD AUTO: 37.6 % (ref 42–52)
HGB BLD-MCNC: 12.3 G/DL (ref 14–18)
MCH RBC QN AUTO: 31.6 PG (ref 27–33)
MCHC RBC AUTO-ENTMCNC: 32.7 G/DL (ref 32.3–36.5)
MCV RBC AUTO: 96.7 FL (ref 81.4–97.8)
PLATELET # BLD AUTO: 169 K/UL (ref 164–446)
PMV BLD AUTO: 10.5 FL (ref 9–12.9)
POTASSIUM SERPL-SCNC: 4.1 MMOL/L (ref 3.6–5.5)
PROT SERPL-MCNC: 6.3 G/DL (ref 6–8.2)
RBC # BLD AUTO: 3.89 M/UL (ref 4.7–6.1)
SODIUM SERPL-SCNC: 136 MMOL/L (ref 135–145)
WBC # BLD AUTO: 6.5 K/UL (ref 4.8–10.8)

## 2025-07-10 PROCEDURE — 700111 HCHG RX REV CODE 636 W/ 250 OVERRIDE (IP): Mod: JZ | Performed by: STUDENT IN AN ORGANIZED HEALTH CARE EDUCATION/TRAINING PROGRAM

## 2025-07-10 PROCEDURE — A9270 NON-COVERED ITEM OR SERVICE: HCPCS | Performed by: NURSE PRACTITIONER

## 2025-07-10 PROCEDURE — 80053 COMPREHEN METABOLIC PANEL: CPT

## 2025-07-10 PROCEDURE — 85027 COMPLETE CBC AUTOMATED: CPT

## 2025-07-10 PROCEDURE — 700102 HCHG RX REV CODE 250 W/ 637 OVERRIDE(OP): Performed by: NURSE PRACTITIONER

## 2025-07-10 PROCEDURE — 700102 HCHG RX REV CODE 250 W/ 637 OVERRIDE(OP): Performed by: STUDENT IN AN ORGANIZED HEALTH CARE EDUCATION/TRAINING PROGRAM

## 2025-07-10 PROCEDURE — A9270 NON-COVERED ITEM OR SERVICE: HCPCS | Performed by: STUDENT IN AN ORGANIZED HEALTH CARE EDUCATION/TRAINING PROGRAM

## 2025-07-10 RX ORDER — OXYCODONE HYDROCHLORIDE 5 MG/1
5 TABLET ORAL EVERY 6 HOURS PRN
Qty: 8 TABLET | Refills: 0 | Status: SHIPPED | OUTPATIENT
Start: 2025-07-10 | End: 2025-07-12

## 2025-07-10 RX ORDER — GABAPENTIN 300 MG/1
300 CAPSULE ORAL EVERY 8 HOURS
Qty: 21 CAPSULE | Refills: 0 | Status: SHIPPED | OUTPATIENT
Start: 2025-07-10 | End: 2025-07-17

## 2025-07-10 RX ORDER — ACETAMINOPHEN 500 MG
500-1000 TABLET ORAL EVERY 6 HOURS PRN
COMMUNITY
Start: 2025-07-10

## 2025-07-10 RX ORDER — AMOXICILLIN 250 MG
1 CAPSULE ORAL NIGHTLY
COMMUNITY
Start: 2025-07-10

## 2025-07-10 RX ADMIN — ACETAMINOPHEN 1000 MG: 500 TABLET ORAL at 12:10

## 2025-07-10 RX ADMIN — ENOXAPARIN SODIUM 40 MG: 100 INJECTION SUBCUTANEOUS at 04:38

## 2025-07-10 RX ADMIN — ACETAMINOPHEN 1000 MG: 500 TABLET ORAL at 04:38

## 2025-07-10 RX ADMIN — CHLORDIAZEPOXIDE HYDROCHLORIDE 25 MG: 25 CAPSULE ORAL at 04:38

## 2025-07-10 RX ADMIN — GABAPENTIN 300 MG: 300 CAPSULE ORAL at 04:38

## 2025-07-10 RX ADMIN — OXYCODONE 5 MG: 5 TABLET ORAL at 04:41

## 2025-07-10 ASSESSMENT — PAIN DESCRIPTION - PAIN TYPE
TYPE: ACUTE PAIN
TYPE: ACUTE PAIN

## 2025-07-10 NOTE — DISCHARGE INSTRUCTIONS
- Call or seek medical attention for questions or concerns  - Follow up with the [facility] RETURN: in 1-2 weeks  - Follow up with primary care provider within one weeks time  - Resume regular diet  - May take over the counter acetaminophen or ibuprofen as needed for pain  - Continue daily over the counter stool softener while on narcotics  - No operation of machinery or motorized vehicles while under the influence of narcotics  - No alcohol, marijuana or illicit drug use while under the influence of narcotics  - In the event of a narcotic overdose naloxone (Narcan) is available without a prescription from any [facility] or [facility]  - No swimming, hot tubs, baths or wound submersion until cleared by outpatient provider. May shower  - No contact sports, strenuous activities, or heavy lifting until cleared by outpatient provider  - If respiratory decompensation, persistent or worsening pain, or signs or symptoms of infection occur seek medical attention

## 2025-07-10 NOTE — PROGRESS NOTES
Jose Daniel Shipley has been provided discharge instructions, to include follow up care, home medications, and activity/diet reviewed. Copy of discharge instructions in patient chart, signed and reviewed. Patient verbalizes the understanding of the discharge instructions. Arm band removed. Patient did not have home meds during admit. Questions and concerns addressed prior to leaving the discharge lounge. Transported via car, accompanied by friend. Patient discharge to home.

## 2025-07-10 NOTE — PROGRESS NOTES
Pt sent down to [facility]. All belongings with pt. Medications sent to [facility] pharmacy.

## 2025-07-10 NOTE — PROGRESS NOTES
Pt admitted to room T414-2 via transport in hospital bed from TICU at 2020.      Patient reports pain at 4 on a scale of 0-10. Educated patient regarding pharmacologic and non pharmacologic modalities for pain management. Oriented to room call light and smoking policy.  Reviewed plan of care (equipment, incentive spirometer, sequential compression devices, medications, activity, diet, fall precautions, skin care, and pain) with patient and family. Welcome packet given and reviewed with patient, all questions answered. Education provided on oral hygiene program.     AA&Ox4. Denies CP/SOB.  See 2 RN skin note  Tolerating regular diet. Denies N/V.  + void + BM. Last BM 7/9  Pt ambulates up self    All needs met at this time. Call light within reach. Pt calls appropriately. Bed low and locked, non skid socks in place. Hourly rounding in place.

## 2025-07-10 NOTE — DISCHARGE SUMMARY
Trauma Discharge Summary    DATE OF ADMISSION: 7/8/2025    DATE OF DISCHARGE: 7/10/2025    LENGTH OF STAY: 2 days    ATTENDING PHYSICIAN: Jonah Shaikh M.D.    CONSULTING PHYSICIAN:   1. None    DISCHARGE DIAGNOSIS:  Principal Problem:    Snake bite, initial encounter  Active Problems:    Acute alcohol intoxication (HCC)    No contraindication to deep vein thrombosis (DVT) prophylaxis    Trauma  Resolved Problems:    * No resolved hospital problems. *      PROCEDURES:  1. None    HISTORY OF PRESENT ILLNESS: The patient is a 45 y.o. male who was reportedly injured when he sustained a rattlesnake bite at approximately 9:30 PM on the day of admission.  He was initially evaluated at [facility] where preliminary workup demonstrated a rattlesnake bite. He was transferred to [facility] in [place], [place].    HOSPITAL COURSE: The patient was triaged as a trauma green transfer activation. The patient was transported to trauma intensive care unit.      On assessment, the patient had swelling to his right middle finger, hand and redness up to mid upper forearm with which was marked.  There was progression of swelling to his mid upper arm. 10 vials of Anavip were administered.  On 7/8/2005 2 additional doses of Anavip were administered with the last dose being at 1815 on 7/8/2025.  48 hours after the last dose of Anavip was administered, the patient right middle finger, hand and forearm had no signs of redness or swelling.  The patient was able to make a fist and complains of no tightness.  He did have some numbness to his right palm but he states that has since improved.  On the day of discharge, he is a GCS of 15 with no focal neurological findings.  He is afebrile and nontoxic in appearance.  He is tolerating a regular diet.  He has adequate pain control.  He has normal bladder and bowel function.  I have discussed strict return precautions to the emergency department.  He  was able to verbally acknowledge them and repeat them back to me.  I discussed follow-up in our clinic and the labs that need to be done 4 days after the last dose of Anavip.  All questions answered.    HOSPITAL PROBLEM LIST:  * Snake bite, initial encounter- (present on admission)  Assessment & Plan  Rattlesnake bite to right middle finger at 2120 7/7.   Swelling to right middle finger, hand, and redness up to mid/upper forearm was marked. Progression of swelling to mid upper arm.  Anavip 10 vials administered.  7/8 Two additional doses of Anavip. Last dose 1815  Monitor for progression of symptoms.  Administer additional Anavip prn.    No contraindication to deep vein thrombosis (DVT) prophylaxis- (present on admission)  Assessment & Plan  Prophylactic dose enoxaparin 40 mg BID initiated upon admission.    Acute alcohol intoxication (HCC)- (present on admission)  Assessment & Plan  Admission blood alcohol level of 205.  Trauma alcohol withdrawal protocol initiated.  Alcohol withdrawal surveillance.  SBIRT complete.  Librium taper ongoing    Trauma- (present on admission)  Assessment & Plan  Rattlesnake bite to right middle finger.  [facility] [place] [place]  Jonah Shaikh MD. Trauma Surgery.          DISPOSITION: Discharged home in stable condition on 7/10/2025. The patient and family were counseled and questions were answered. Specifically, signs and symptoms of infection, respiratory decompensation and persistent or worsening pain were discussed and the patient agrees to seek medical attention if any of these develop.    DISCHARGE MEDICATIONS:  The patients controlled substance history was reviewed and a controlled substance use informed consent (if applicable) was provided by [facility] and the patient has been prescribed.     Medication List        START taking these medications        Instructions   acetaminophen  500 MG Tabs  Commonly known as: Tylenol   Take 1-2 Tablets by mouth every 6 hours as needed for Mild Pain or Moderate Pain. Take as directed on the bottle. Take as needed for pain  Dose: 500-1,000 mg     gabapentin 300 MG Caps  Commonly known as: Neurontin   Take 1 Capsule by mouth every 8 hours for 7 days.  Dose: 300 mg     oxyCODONE immediate-release 5 MG Tabs  Commonly known as: Roxicodone   Take 1 Tablet by mouth every 6 hours as needed for Severe Pain for up to 2 days.  Dose: 5 mg     senna-docusate 8.6-50 MG Tabs  Commonly known as: Pericolace Or Senokot S   Take 1 Tablet by mouth every evening. Take daily while on narcotics to avoid constipation  Dose: 1 Tablet              ACTIVITY:  As tolerated    WOUND CARE:  Continue to monitor right hand for any edema, tightness, or erythema.    DIET:  No orders of the defined types were placed in this encounter.      FOLLOW UP:  [facility]  [address] [place] [id number] [phone]  Follow up  Follow up in the Trauma clinic in 1-2 weeks for wound recheck. Please call the office for an appt      TIME SPENT ON DISCHARGE: 35 minutes      ____________________________________________  Mariza Grande D.N.P.    DD: 7/10/2025 11:53 AM

## 2025-07-10 NOTE — PROGRESS NOTES
4 Eyes Skin Assessment Completed by SHYANN Valdovinos and SHYANN Medeiros.    Skin assessment is primarily focused on high risk bony prominences. Pay special attention to skin beneath and around medical devices, high risk bony prominences, skin to skin areas and areas where the patient lacks sensation to feel pain and areas where the patient previously had breakdown.     Head (Occipital):  WDL   Ears (Under Medical Devices): WDL   Nose (Under Medical Devices): WDL   Mouth:  WDL   Neck: WDL   Breast/Chest:  WDL   Shoulder Blades:  WDL   Spine:   WDL   (R) Arm/Elbow/Hand: Red and Edema, snake bite wound on middle finger.    (L) Arm/Elbow/Hand: WDL   Abdomen: WDL   Pannus/Groin:  WDL   Sacrum/Coccyx:   WDL   (R) Ischial Tuberosity (Sit Bones):  WDL   (L) Ischial Tuberosity (Sit Bones):  WDL   (R) Leg:  WDL   (L) Leg:  WDL   (R) Heel:  WDL   (R) Foot/Toe: Scab   (L) Heel: WDL   (L) Foot/Toe:  Scab       DEVICES IN USE:   Respiratory Devices:  Pulse ox  Feeding Devices:  N/A   Lines & BP Monitoring Devices:  BP cuff and Pulse ox    Orthopedic Devices:  N/A  Miscellaneous Devices:  N/A    PROTOCOL INTERVENTIONS:   Standard/Trauma Bed:  Already in place    WOUND PHOTOS:   N/A no wounds identified    WOUND CONSULT:   N/A, no advanced wound care needs identified

## 2025-07-10 NOTE — CARE PLAN
The patient is Stable - Low risk of patient condition declining or worsening    Shift Goals  Clinical Goals: pain management, monitor RUE, rest  Patient Goals: pain management, rest  Family Goals: DONALDO    Progress made toward(s) clinical / shift goals:    Problem: Knowledge Deficit - Standard  Goal: Patient and family/care givers will demonstrate understanding of plan of care, disease process/condition, diagnostic tests and medications  Outcome: Progressing     Problem: Pain - Standard  Goal: Alleviation of pain or a reduction in pain to the patient’s comfort goal  Outcome: Progressing     Problem: Psychosocial  Goal: Patient's level of anxiety will decrease  Outcome: Progressing       Patient is not progressing towards the following goals:

## 2025-07-12 LAB — COMPONENT CELLULAR 8504CLL: NORMAL
